# Patient Record
Sex: MALE | Race: ASIAN | HISPANIC OR LATINO | URBAN - METROPOLITAN AREA
[De-identification: names, ages, dates, MRNs, and addresses within clinical notes are randomized per-mention and may not be internally consistent; named-entity substitution may affect disease eponyms.]

---

## 2019-11-02 LAB
CREAT ?TM UR-SCNC: 69 UMOL/L
EXT MICROALBUMIN URINE RANDOM: 333.4
MICROALBUMIN/CREAT UR: 485.2 MG/G{CREAT}

## 2020-03-20 LAB — HBA1C MFR BLD HPLC: 8.8 %

## 2020-04-27 DIAGNOSIS — E11.649 UNCONTROLLED TYPE 2 DIABETES MELLITUS WITH HYPOGLYCEMIA, UNSPECIFIED HYPOGLYCEMIA COMA STATUS (HCC): Primary | ICD-10-CM

## 2020-04-27 RX ORDER — DULAGLUTIDE 0.75 MG/.5ML
0.5 INJECTION, SOLUTION SUBCUTANEOUS WEEKLY
COMMUNITY
Start: 2020-02-10 | End: 2020-04-27 | Stop reason: SDUPTHER

## 2020-04-27 RX ORDER — DULAGLUTIDE 0.75 MG/.5ML
0.5 INJECTION, SOLUTION SUBCUTANEOUS WEEKLY
Qty: 12 PEN | Refills: 1 | Status: SHIPPED | OUTPATIENT
Start: 2020-04-27 | End: 2020-06-29 | Stop reason: SDUPTHER

## 2020-06-08 ENCOUNTER — TELEPHONE (OUTPATIENT)
Dept: FAMILY MEDICINE CLINIC | Facility: CLINIC | Age: 45
End: 2020-06-08

## 2020-06-08 DIAGNOSIS — E11.649 UNCONTROLLED TYPE 2 DIABETES MELLITUS WITH HYPOGLYCEMIA, UNSPECIFIED HYPOGLYCEMIA COMA STATUS (HCC): Primary | ICD-10-CM

## 2020-06-10 RX ORDER — EMPAGLIFLOZIN, METFORMIN HYDROCHLORIDE 25; 1000 MG/1; MG/1
1 TABLET, EXTENDED RELEASE ORAL DAILY
COMMUNITY
Start: 2020-05-10 | End: 2020-06-10 | Stop reason: SDUPTHER

## 2020-06-10 RX ORDER — EMPAGLIFLOZIN, METFORMIN HYDROCHLORIDE 25; 1000 MG/1; MG/1
1 TABLET, EXTENDED RELEASE ORAL DAILY
Qty: 90 TABLET | Refills: 1 | Status: SHIPPED | OUTPATIENT
Start: 2020-06-10 | End: 2020-06-29 | Stop reason: SDUPTHER

## 2020-06-29 ENCOUNTER — OFFICE VISIT (OUTPATIENT)
Dept: FAMILY MEDICINE CLINIC | Facility: CLINIC | Age: 45
End: 2020-06-29
Payer: COMMERCIAL

## 2020-06-29 VITALS
HEIGHT: 67 IN | SYSTOLIC BLOOD PRESSURE: 108 MMHG | WEIGHT: 143.4 LBS | HEART RATE: 88 BPM | OXYGEN SATURATION: 98 % | RESPIRATION RATE: 16 BRPM | TEMPERATURE: 98.6 F | DIASTOLIC BLOOD PRESSURE: 74 MMHG | BODY MASS INDEX: 22.51 KG/M2

## 2020-06-29 DIAGNOSIS — H35.81 MACULAR RETINAL EDEMA: ICD-10-CM

## 2020-06-29 DIAGNOSIS — E78.2 MIXED HYPERLIPIDEMIA: ICD-10-CM

## 2020-06-29 DIAGNOSIS — E11.65 TYPE 2 DIABETES MELLITUS WITH HYPERGLYCEMIA, WITHOUT LONG-TERM CURRENT USE OF INSULIN (HCC): ICD-10-CM

## 2020-06-29 DIAGNOSIS — Z28.39 IMMUNIZATION DEFICIENCY: Primary | ICD-10-CM

## 2020-06-29 DIAGNOSIS — E13.319 RETINOPATHY DUE TO SECONDARY DIABETES (HCC): ICD-10-CM

## 2020-06-29 DIAGNOSIS — R80.9 MICROALBUMINURIA: ICD-10-CM

## 2020-06-29 DIAGNOSIS — E11.649 UNCONTROLLED TYPE 2 DIABETES MELLITUS WITH HYPOGLYCEMIA, UNSPECIFIED HYPOGLYCEMIA COMA STATUS (HCC): ICD-10-CM

## 2020-06-29 LAB — SL AMB POCT HGB: 8

## 2020-06-29 PROCEDURE — 4010F ACE/ARB THERAPY RXD/TAKEN: CPT | Performed by: FAMILY MEDICINE

## 2020-06-29 PROCEDURE — 99214 OFFICE O/P EST MOD 30 MIN: CPT | Performed by: FAMILY MEDICINE

## 2020-06-29 PROCEDURE — 1036F TOBACCO NON-USER: CPT | Performed by: FAMILY MEDICINE

## 2020-06-29 PROCEDURE — 3008F BODY MASS INDEX DOCD: CPT | Performed by: FAMILY MEDICINE

## 2020-06-29 PROCEDURE — 90471 IMMUNIZATION ADMIN: CPT | Performed by: FAMILY MEDICINE

## 2020-06-29 PROCEDURE — 85018 HEMOGLOBIN: CPT | Performed by: FAMILY MEDICINE

## 2020-06-29 PROCEDURE — 90746 HEPB VACCINE 3 DOSE ADULT IM: CPT | Performed by: FAMILY MEDICINE

## 2020-06-29 PROCEDURE — 3066F NEPHROPATHY DOC TX: CPT | Performed by: FAMILY MEDICINE

## 2020-06-29 RX ORDER — ATORVASTATIN CALCIUM 20 MG/1
TABLET, FILM COATED ORAL
COMMUNITY
Start: 2020-04-17 | End: 2020-06-29 | Stop reason: SDUPTHER

## 2020-06-29 RX ORDER — ATORVASTATIN CALCIUM 20 MG/1
20 TABLET, FILM COATED ORAL DAILY
Qty: 90 TABLET | Refills: 2 | Status: SHIPPED | OUTPATIENT
Start: 2020-06-29 | End: 2020-12-02 | Stop reason: SDUPTHER

## 2020-06-29 RX ORDER — EMPAGLIFLOZIN, METFORMIN HYDROCHLORIDE 25; 1000 MG/1; MG/1
1 TABLET, EXTENDED RELEASE ORAL DAILY
Qty: 90 TABLET | Refills: 1 | Status: SHIPPED | OUTPATIENT
Start: 2020-06-29 | End: 2021-01-13 | Stop reason: SDUPTHER

## 2020-06-29 RX ORDER — LISINOPRIL 2.5 MG/1
2.5 TABLET ORAL DAILY
Qty: 90 TABLET | Refills: 3 | Status: SHIPPED | OUTPATIENT
Start: 2020-06-29 | End: 2021-03-10 | Stop reason: SDUPTHER

## 2020-06-29 RX ORDER — DULAGLUTIDE 0.75 MG/.5ML
0.5 INJECTION, SOLUTION SUBCUTANEOUS WEEKLY
Qty: 12 PEN | Refills: 2 | Status: SHIPPED | OUTPATIENT
Start: 2020-06-29 | End: 2021-03-10

## 2020-07-30 ENCOUNTER — CLINICAL SUPPORT (OUTPATIENT)
Dept: FAMILY MEDICINE CLINIC | Facility: CLINIC | Age: 45
End: 2020-07-30
Payer: COMMERCIAL

## 2020-07-30 DIAGNOSIS — Z28.39 IMMUNIZATION DEFICIENCY: Primary | ICD-10-CM

## 2020-07-30 PROCEDURE — 90746 HEPB VACCINE 3 DOSE ADULT IM: CPT

## 2020-07-30 PROCEDURE — 90471 IMMUNIZATION ADMIN: CPT

## 2020-10-16 ENCOUNTER — TELEPHONE (OUTPATIENT)
Dept: FAMILY MEDICINE CLINIC | Facility: CLINIC | Age: 45
End: 2020-10-16

## 2020-10-30 DIAGNOSIS — E11.65 TYPE 2 DIABETES MELLITUS WITH HYPERGLYCEMIA, WITHOUT LONG-TERM CURRENT USE OF INSULIN (HCC): Primary | ICD-10-CM

## 2020-10-30 RX ORDER — SEMAGLUTIDE 1.34 MG/ML
0.5 INJECTION, SOLUTION SUBCUTANEOUS WEEKLY
Qty: 4 PEN | Refills: 2 | Status: SHIPPED | OUTPATIENT
Start: 2020-10-30 | End: 2021-03-10 | Stop reason: ALTCHOICE

## 2020-12-02 DIAGNOSIS — E78.2 MIXED HYPERLIPIDEMIA: ICD-10-CM

## 2020-12-02 RX ORDER — ATORVASTATIN CALCIUM 20 MG/1
20 TABLET, FILM COATED ORAL DAILY
Qty: 90 TABLET | Refills: 2 | Status: SHIPPED | OUTPATIENT
Start: 2020-12-02 | End: 2020-12-09 | Stop reason: SDUPTHER

## 2020-12-03 ENCOUNTER — TELEPHONE (OUTPATIENT)
Dept: FAMILY MEDICINE CLINIC | Facility: CLINIC | Age: 45
End: 2020-12-03

## 2020-12-03 DIAGNOSIS — E11.65 TYPE 2 DIABETES MELLITUS WITH HYPERGLYCEMIA, WITHOUT LONG-TERM CURRENT USE OF INSULIN (HCC): Primary | ICD-10-CM

## 2020-12-09 DIAGNOSIS — E78.2 MIXED HYPERLIPIDEMIA: ICD-10-CM

## 2020-12-09 LAB
LEFT EYE DIABETIC RETINOPATHY: NORMAL
RIGHT EYE DIABETIC RETINOPATHY: NORMAL

## 2020-12-09 RX ORDER — ATORVASTATIN CALCIUM 20 MG/1
20 TABLET, FILM COATED ORAL DAILY
Qty: 90 TABLET | Refills: 2 | Status: SHIPPED | OUTPATIENT
Start: 2020-12-09 | End: 2021-03-10 | Stop reason: SDUPTHER

## 2020-12-09 RX ORDER — ATORVASTATIN CALCIUM 20 MG/1
20 TABLET, FILM COATED ORAL DAILY
Qty: 90 TABLET | Refills: 2 | Status: CANCELLED | OUTPATIENT
Start: 2020-12-09 | End: 2021-03-09

## 2020-12-09 RX ORDER — ATORVASTATIN CALCIUM 20 MG/1
20 TABLET, FILM COATED ORAL DAILY
Qty: 90 TABLET | Refills: 2 | Status: SHIPPED | OUTPATIENT
Start: 2020-12-09 | End: 2020-12-09 | Stop reason: SDUPTHER

## 2020-12-09 NOTE — TELEPHONE ENCOUNTER
Pt's wife called and stated that he is in need of his atorvastatin to be sent to [facility]

## 2020-12-24 DIAGNOSIS — E11.65 TYPE 2 DIABETES MELLITUS WITH HYPERGLYCEMIA, WITHOUT LONG-TERM CURRENT USE OF INSULIN (HCC): ICD-10-CM

## 2021-01-13 DIAGNOSIS — E11.649 UNCONTROLLED TYPE 2 DIABETES MELLITUS WITH HYPOGLYCEMIA, UNSPECIFIED HYPOGLYCEMIA COMA STATUS (HCC): ICD-10-CM

## 2021-01-13 RX ORDER — EMPAGLIFLOZIN, METFORMIN HYDROCHLORIDE 25; 1000 MG/1; MG/1
1 TABLET, EXTENDED RELEASE ORAL DAILY
Qty: 90 TABLET | Refills: 1 | Status: SHIPPED | OUTPATIENT
Start: 2021-01-13 | End: 2021-01-18 | Stop reason: SDUPTHER

## 2021-01-18 ENCOUNTER — TELEPHONE (OUTPATIENT)
Dept: FAMILY MEDICINE CLINIC | Facility: CLINIC | Age: 46
End: 2021-01-18

## 2021-01-18 DIAGNOSIS — E11.649 UNCONTROLLED TYPE 2 DIABETES MELLITUS WITH HYPOGLYCEMIA, UNSPECIFIED HYPOGLYCEMIA COMA STATUS (HCC): ICD-10-CM

## 2021-01-18 RX ORDER — EMPAGLIFLOZIN, METFORMIN HYDROCHLORIDE 25; 1000 MG/1; MG/1
1 TABLET, EXTENDED RELEASE ORAL DAILY
Qty: 90 TABLET | Refills: 1 | Status: SHIPPED | OUTPATIENT
Start: 2021-01-18 | End: 2021-03-10 | Stop reason: SDUPTHER

## 2021-01-18 NOTE — TELEPHONE ENCOUNTER
Pt's wife called - per MyChart medication Synjardy XR 25-100Mg (1) daily is ok to reorder but [address] in [facility] [place] does not have - the Rx was called into [facility] for Mrs       Called back - [facility] is not showing the medication either - could the Rx please be called into [facility] for Pt

## 2021-03-08 LAB
LEFT EYE DIABETIC RETINOPATHY: NORMAL
RIGHT EYE DIABETIC RETINOPATHY: NORMAL

## 2021-03-10 ENCOUNTER — OFFICE VISIT (OUTPATIENT)
Dept: FAMILY MEDICINE CLINIC | Facility: CLINIC | Age: 46
End: 2021-03-10
Payer: COMMERCIAL

## 2021-03-10 VITALS
DIASTOLIC BLOOD PRESSURE: 90 MMHG | HEART RATE: 83 BPM | BODY MASS INDEX: 22.44 KG/M2 | SYSTOLIC BLOOD PRESSURE: 128 MMHG | TEMPERATURE: 97.2 F | OXYGEN SATURATION: 98 % | HEIGHT: 67 IN | RESPIRATION RATE: 16 BRPM | WEIGHT: 143 LBS

## 2021-03-10 DIAGNOSIS — E53.8 CYANOCOBALAMIN DEFICIENCY: ICD-10-CM

## 2021-03-10 DIAGNOSIS — E11.65 TYPE 2 DIABETES MELLITUS WITH HYPERGLYCEMIA, WITHOUT LONG-TERM CURRENT USE OF INSULIN (HCC): Primary | ICD-10-CM

## 2021-03-10 DIAGNOSIS — E55.9 VITAMIN D DEFICIENCY: ICD-10-CM

## 2021-03-10 DIAGNOSIS — R80.9 MICROALBUMINURIA: ICD-10-CM

## 2021-03-10 DIAGNOSIS — Z23 ENCOUNTER FOR IMMUNIZATION: ICD-10-CM

## 2021-03-10 DIAGNOSIS — Z28.39 IMMUNIZATION DEFICIENCY: ICD-10-CM

## 2021-03-10 DIAGNOSIS — Z11.9 ENCOUNTER FOR SCREENING FOR INFECTIOUS AND PARASITIC DISEASES, UNSPECIFIED: ICD-10-CM

## 2021-03-10 DIAGNOSIS — Z11.4 SCREENING FOR HIV (HUMAN IMMUNODEFICIENCY VIRUS): ICD-10-CM

## 2021-03-10 DIAGNOSIS — I10 ESSENTIAL HYPERTENSION: ICD-10-CM

## 2021-03-10 DIAGNOSIS — E78.2 MIXED HYPERLIPIDEMIA: ICD-10-CM

## 2021-03-10 DIAGNOSIS — Z23 NEED FOR VACCINATION: ICD-10-CM

## 2021-03-10 DIAGNOSIS — E11.649 UNCONTROLLED TYPE 2 DIABETES MELLITUS WITH HYPOGLYCEMIA, UNSPECIFIED HYPOGLYCEMIA COMA STATUS (HCC): ICD-10-CM

## 2021-03-10 LAB — SL AMB POCT HEMOGLOBIN AIC: 7.6 (ref ?–6.5)

## 2021-03-10 PROCEDURE — 83036 HEMOGLOBIN GLYCOSYLATED A1C: CPT | Performed by: FAMILY MEDICINE

## 2021-03-10 PROCEDURE — 36416 COLLJ CAPILLARY BLOOD SPEC: CPT | Performed by: FAMILY MEDICINE

## 2021-03-10 PROCEDURE — 99214 OFFICE O/P EST MOD 30 MIN: CPT | Performed by: FAMILY MEDICINE

## 2021-03-10 PROCEDURE — 90471 IMMUNIZATION ADMIN: CPT | Performed by: FAMILY MEDICINE

## 2021-03-10 PROCEDURE — 90746 HEPB VACCINE 3 DOSE ADULT IM: CPT | Performed by: FAMILY MEDICINE

## 2021-03-10 RX ORDER — DULAGLUTIDE 1.5 MG/.5ML
1.5 INJECTION, SOLUTION SUBCUTANEOUS WEEKLY
Qty: 12 PEN | Refills: 2 | Status: SHIPPED | OUTPATIENT
Start: 2021-03-10 | End: 2021-09-15 | Stop reason: SDUPTHER

## 2021-03-10 RX ORDER — ATORVASTATIN CALCIUM 20 MG/1
20 TABLET, FILM COATED ORAL DAILY
Qty: 90 TABLET | Refills: 2 | Status: SHIPPED | OUTPATIENT
Start: 2021-03-10 | End: 2021-09-26 | Stop reason: SDUPTHER

## 2021-03-10 RX ORDER — LISINOPRIL 2.5 MG/1
2.5 TABLET ORAL DAILY
Qty: 90 TABLET | Refills: 3 | Status: SHIPPED | OUTPATIENT
Start: 2021-03-10 | End: 2021-10-05 | Stop reason: SDUPTHER

## 2021-03-10 RX ORDER — EMPAGLIFLOZIN, METFORMIN HYDROCHLORIDE 25; 1000 MG/1; MG/1
1 TABLET, EXTENDED RELEASE ORAL DAILY
Qty: 90 TABLET | Refills: 2 | Status: SHIPPED | OUTPATIENT
Start: 2021-03-10 | End: 2021-07-15 | Stop reason: SDUPTHER

## 2021-03-10 NOTE — PROGRESS NOTES
Assessment/Plan: Third hep B vaccine given to him today  Hemoglobin A1c done in office is 7 6 much better than before it was 9 the past   Will see him back in 3 months follow-up blood sugar blood test   Meds refill for him  I have spent 25 minutes with Patient and family today in which greater than 50% of this time was spent in counseling/coordination of care regarding Risks and benefits of tx options         Problem List Items Addressed This Visit        Endocrine    Type 2 diabetes mellitus ([facility] [address] ) - Primary    Relevant Medications    Dulaglutide (Trulicity) 1 5 EJ/0 4BB SOPN    Synjardy XR  MG TB24    Other Relevant Orders    POCT hemoglobin A1c (Completed)    Microalbumin,Urine    CBC and differential    Comprehensive metabolic panel    Lipid Panel with Direct LDL reflex    Hemoglobin A1C       Other    Hyperlipidemia    Relevant Medications    atorvastatin (LIPITOR) 20 mg tablet    Microalbuminuria    Relevant Medications    lisinopril (ZESTRIL) 2 5 mg tablet    Immunization deficiency    Relevant Orders    Hepatitis A antibody, total      Other Visit Diagnoses     Screening for HIV (human immunodeficiency virus)        Relevant Orders    HIV 1/2 Antigen/Antibody (4th Generation) w Reflex [facility]    Essential hypertension        Relevant Medications    lisinopril (ZESTRIL) 2 5 mg tablet    Other Relevant Orders    Microalbumin,Urine    Comprehensive metabolic panel    TSH, 3rd generation with Free T4 reflex    UA w Reflex to Microscopic w Reflex to Culture    Uric acid    Vitamin D deficiency        Relevant Orders    Vitamin D 25 hydroxy    Cyanocobalamin deficiency        Relevant Orders    Vitamin B12    Uncontrolled type 2 diabetes mellitus with hypoglycemia, unspecified hypoglycemia coma status (HCC)        Relevant Medications    Dulaglutide (Trulicity) 1 5 ER/4 6AD SOPN    Synjardy XR  MG TB24    Encounter for screening for infectious and parasitic diseases, unspecified        Relevant Orders    Novel Coronavirus (Covid-19),PCR [facility] - Collected at [facility] or [facility]            Subjective:      Patient ID: Rhonda Hamman is a 50 y o  male  24-year-old male follow-up type 2 diabetes microalbuminuria  He is on Trulicity 1 5 mg weekly Synjardy daily  He was placed on lisinopril 2 5 mg daily for microalbuminuria  He has diabetic retinopathy follow-up with Dr Viviana Mujica  He had injection both his eyes  He works as a  he paints cars  [place]  He needs COVID screening  He is not immune to hepatitis-B infection  He had 2/3  vaccine  The following portions of the patient's history were reviewed and updated as appropriate:   Past Medical History:  He has a past medical history of Diabetes mellitus ([facility] ), Hyperlipidemia, Immunization deficiency (6/29/2020), Macular retinal edema, Microalbuminuria (6/29/2020), Retinopathy due to secondary diabetes ([facility] ), and Type 2 diabetes mellitus ([facility] )  ,  _______________________________________________________________________  Medical Problems:  does not have any pertinent problems on file ,  _______________________________________________________________________  Past Surgical History:   has a past surgical history that includes Lipoma resection  ,  _______________________________________________________________________  Family History:  family history includes Cancer in his maternal uncle; Hypertension in his father; Stroke in his father ,  _______________________________________________________________________  Social History:   reports that he has never smoked  He has never used smokeless tobacco  He reports current alcohol use  No history on file for drug ,  _______________________________________________________________________  Allergies:  has No Known Allergies     _______________________________________________________________________  Current Outpatient Medications   Medication Sig Dispense Refill    atorvastatin (LIPITOR) 20 mg tablet Take 1 tablet (20 mg total) by mouth daily 90 tablet 2    Dulaglutide (Trulicity) 1 5 XY/8 5MM SOPN Inject 0 5 mL (1 5 mg total) under the skin once a week 12 pen 2    lisinopril (ZESTRIL) 2 5 mg tablet Take 1 tablet (2 5 mg total) by mouth daily 90 tablet 3    Omega-3 Fatty Acids (FISH OIL PO) Take by mouth      Synjardy XR  MG TB24 Take 1 tablet by mouth daily 90 tablet 2     No current facility-administered medications for this visit       _______________________________________________________________________  Review of Systems   Constitutional: Negative for activity change, appetite change, fatigue, fever and unexpected weight change  HENT: Negative for dental problem and trouble swallowing  Eyes: Negative for photophobia and visual disturbance  Respiratory: Negative for cough and chest tightness  Cardiovascular: Negative for chest pain, palpitations and leg swelling  Gastrointestinal: Negative for abdominal pain, constipation and vomiting  Endocrine: Negative for cold intolerance, polydipsia and polyuria  Genitourinary: Negative for difficulty urinating, frequency and urgency  Musculoskeletal: Negative for arthralgias, joint swelling, myalgias and neck pain  Skin: Negative for color change, rash and wound  Allergic/Immunologic: Negative for environmental allergies  Neurological: Negative for dizziness, weakness and numbness  Hematological: Does not bruise/bleed easily  Psychiatric/Behavioral: Negative for decreased concentration, dysphoric mood, self-injury, sleep disturbance and suicidal ideas  Objective:  Vitals:    03/10/21 1448   BP: 128/90   BP Location: Left arm   Patient Position: Sitting   Cuff Size: Standard   Pulse: 83   Resp: 16   Temp: (!) 97 2 °F (36 2 °C)   TempSrc: Temporal   SpO2: 98%   Weight: 64 9 kg (143 lb)   Height: 5' 7" (1 702 m)     Body mass index is 22 4 kg/m²       Physical Exam  Vitals signs and nursing note reviewed  Constitutional:       Appearance: Normal appearance  He is well-developed  HENT:      Head: Normocephalic and atraumatic  Right Ear: Tympanic membrane normal       Left Ear: Tympanic membrane normal    Eyes:      Pupils: Pupils are equal, round, and reactive to light  Neck:      Musculoskeletal: Normal range of motion and neck supple  Cardiovascular:      Rate and Rhythm: Normal rate and regular rhythm  Pulses: no weak pulses          Dorsalis pedis pulses are 2+ on the right side and 2+ on the left side  Posterior tibial pulses are 1+ on the right side and 1+ on the left side  Heart sounds: Normal heart sounds  Pulmonary:      Effort: Pulmonary effort is normal       Breath sounds: Normal breath sounds  Abdominal:      General: Bowel sounds are normal       Palpations: Abdomen is soft  Musculoskeletal: Normal range of motion  Feet:      Right foot:      Skin integrity: Callus present  No ulcer, skin breakdown, erythema, warmth or dry skin  Left foot:      Skin integrity: Callus present  No ulcer, skin breakdown, erythema, warmth or dry skin  Skin:     General: Skin is warm and dry  Capillary Refill: Capillary refill takes less than 2 seconds  Neurological:      General: No focal deficit present  Mental Status: He is alert and oriented to person, place, and time  Mental status is at baseline  Psychiatric:         Mood and Affect: Mood normal          Behavior: Behavior normal          Thought Content: Thought content normal          Judgment: Judgment normal        Patient's shoes and socks removed  Right Foot/Ankle   Right Foot Inspection  Skin Exam: skin normal, skin intact, callus and callus no dry skin, no warmth, no erythema, no maceration, no abnormal color, no pre-ulcer and no ulcer                          Toe Exam: ROM and strength within normal limits  Sensory   Vibration: intact  Proprioception: intact   Monofilament testing: intact  Vascular  Capillary refills: < 3 seconds  The right DP pulse is 2+  The right PT pulse is 1+  Right Toe  - Comprehensive Exam  Ecchymosis: none  Arch: normal  Hammertoes: absent  Claw Toes: absent  Swelling: none   Tenderness: none         Left Foot/Ankle  Left Foot Inspection  Skin Exam: skin normal, skin intact and callusno dry skin, no warmth, no erythema, no maceration, normal color, no pre-ulcer and no ulcer                         Toe Exam: ROM and strength within normal limits                   Sensory   Vibration: intact  Proprioception: intact  Monofilament: intact  Vascular  Capillary refills: < 3 seconds  The left DP pulse is 2+  The left PT pulse is 1+  Left Toe  - Comprehensive Exam  Ecchymosis: none  Arch: normal  Hammertoes: absent  Claw toes: absent  Swelling: none   Tenderness: none       Assign Risk Category:  No deformity present; No loss of protective sensation;  No weak pulses       Risk: 0

## 2021-03-16 ENCOUNTER — IMMUNIZATIONS (OUTPATIENT)
Dept: FAMILY MEDICINE CLINIC | Facility: HOSPITAL | Age: 46
End: 2021-03-16

## 2021-03-16 DIAGNOSIS — Z23 ENCOUNTER FOR IMMUNIZATION: Primary | ICD-10-CM

## 2021-03-16 PROCEDURE — 0011A SARS-COV-2 / COVID-19 MRNA VACCINE (MODERNA) 100 MCG: CPT

## 2021-03-16 PROCEDURE — 91301 SARS-COV-2 / COVID-19 MRNA VACCINE (MODERNA) 100 MCG: CPT

## 2021-04-15 ENCOUNTER — IMMUNIZATIONS (OUTPATIENT)
Dept: FAMILY MEDICINE CLINIC | Facility: HOSPITAL | Age: 46
End: 2021-04-15

## 2021-04-15 DIAGNOSIS — Z23 ENCOUNTER FOR IMMUNIZATION: Primary | ICD-10-CM

## 2021-04-15 PROCEDURE — 91301 SARS-COV-2 / COVID-19 MRNA VACCINE (MODERNA) 100 MCG: CPT

## 2021-04-15 PROCEDURE — 0012A SARS-COV-2 / COVID-19 MRNA VACCINE (MODERNA) 100 MCG: CPT

## 2021-05-29 ENCOUNTER — APPOINTMENT (OUTPATIENT)
Dept: LAB | Age: 46
End: 2021-05-29
Payer: COMMERCIAL

## 2021-05-29 DIAGNOSIS — E53.8 CYANOCOBALAMIN DEFICIENCY: ICD-10-CM

## 2021-05-29 DIAGNOSIS — Z11.4 SCREENING FOR HIV (HUMAN IMMUNODEFICIENCY VIRUS): ICD-10-CM

## 2021-05-29 DIAGNOSIS — E11.65 TYPE 2 DIABETES MELLITUS WITH HYPERGLYCEMIA, WITHOUT LONG-TERM CURRENT USE OF INSULIN (HCC): ICD-10-CM

## 2021-05-29 DIAGNOSIS — Z28.39 IMMUNIZATION DEFICIENCY: ICD-10-CM

## 2021-05-29 DIAGNOSIS — I10 ESSENTIAL HYPERTENSION: ICD-10-CM

## 2021-05-29 DIAGNOSIS — E55.9 VITAMIN D DEFICIENCY: ICD-10-CM

## 2021-05-29 LAB
25(OH)D3 SERPL-MCNC: 32.8 NG/ML (ref 30–100)
ALBUMIN SERPL BCP-MCNC: 3.9 G/DL (ref 3.5–5)
ALP SERPL-CCNC: 75 U/L (ref 46–116)
ALT SERPL W P-5'-P-CCNC: 40 U/L (ref 12–78)
ANION GAP SERPL CALCULATED.3IONS-SCNC: 1 MMOL/L (ref 4–13)
ANISOCYTOSIS BLD QL SMEAR: PRESENT
AST SERPL W P-5'-P-CCNC: 21 U/L (ref 5–45)
BACTERIA UR QL AUTO: NORMAL /HPF
BASOPHILS # BLD MANUAL: 0 THOUSAND/UL (ref 0–0.1)
BASOPHILS NFR MAR MANUAL: 0 % (ref 0–1)
BILIRUB SERPL-MCNC: 0.64 MG/DL (ref 0.2–1)
BILIRUB UR QL STRIP: NEGATIVE
BUN SERPL-MCNC: 22 MG/DL (ref 5–25)
CALCIUM SERPL-MCNC: 9.4 MG/DL (ref 8.3–10.1)
CHLORIDE SERPL-SCNC: 105 MMOL/L (ref 100–108)
CHOLEST SERPL-MCNC: 140 MG/DL (ref 50–200)
CLARITY UR: CLEAR
CO2 SERPL-SCNC: 31 MMOL/L (ref 21–32)
COLOR UR: YELLOW
CREAT SERPL-MCNC: 0.77 MG/DL (ref 0.6–1.3)
CREAT UR-MCNC: 65.9 MG/DL
EOSINOPHIL # BLD MANUAL: 3.03 THOUSAND/UL (ref 0–0.4)
EOSINOPHIL NFR BLD MANUAL: 31 % (ref 0–6)
ERYTHROCYTE [DISTWIDTH] IN BLOOD BY AUTOMATED COUNT: 12 % (ref 11.6–15.1)
EST. AVERAGE GLUCOSE BLD GHB EST-MCNC: 183 MG/DL
GFR SERPL CREATININE-BSD FRML MDRD: 108 ML/MIN/1.73SQ M
GLUCOSE P FAST SERPL-MCNC: 143 MG/DL (ref 65–99)
GLUCOSE UR STRIP-MCNC: ABNORMAL MG/DL
HBA1C MFR BLD: 8 %
HCT VFR BLD AUTO: 42.7 % (ref 36.5–49.3)
HDLC SERPL-MCNC: 45 MG/DL
HGB BLD-MCNC: 13.4 G/DL (ref 12–17)
HGB UR QL STRIP.AUTO: NEGATIVE
HIV 1+2 AB+HIV1 P24 AG SERPL QL IA: NORMAL
HYALINE CASTS #/AREA URNS LPF: NORMAL /LPF
KETONES UR STRIP-MCNC: NEGATIVE MG/DL
LDLC SERPL CALC-MCNC: 83 MG/DL (ref 0–100)
LEUKOCYTE ESTERASE UR QL STRIP: ABNORMAL
LYMPHOCYTES # BLD AUTO: 3.22 THOUSAND/UL (ref 0.6–4.47)
LYMPHOCYTES # BLD AUTO: 33 % (ref 14–44)
MCH RBC QN AUTO: 30.5 PG (ref 26.8–34.3)
MCHC RBC AUTO-ENTMCNC: 31.4 G/DL (ref 31.4–37.4)
MCV RBC AUTO: 97 FL (ref 82–98)
MICROALBUMIN UR-MCNC: 50.2 MG/L (ref 0–20)
MICROALBUMIN/CREAT 24H UR: 76 MG/G CREATININE (ref 0–30)
MONOCYTES # BLD AUTO: 0.49 THOUSAND/UL (ref 0–1.22)
MONOCYTES NFR BLD: 5 % (ref 4–12)
NEUTROPHILS # BLD MANUAL: 3.03 THOUSAND/UL (ref 1.85–7.62)
NEUTS SEG NFR BLD AUTO: 31 % (ref 43–75)
NITRITE UR QL STRIP: NEGATIVE
NON-SQ EPI CELLS URNS QL MICRO: NORMAL /HPF
NRBC BLD AUTO-RTO: 0 /100 WBCS
PH UR STRIP.AUTO: 6 [PH]
PLATELET # BLD AUTO: 214 THOUSANDS/UL (ref 149–390)
PLATELET BLD QL SMEAR: ADEQUATE
PMV BLD AUTO: 10.7 FL (ref 8.9–12.7)
POLYCHROMASIA BLD QL SMEAR: PRESENT
POTASSIUM SERPL-SCNC: 5 MMOL/L (ref 3.5–5.3)
PROT SERPL-MCNC: 7.5 G/DL (ref 6.4–8.2)
PROT UR STRIP-MCNC: NEGATIVE MG/DL
RBC # BLD AUTO: 4.39 MILLION/UL (ref 3.88–5.62)
RBC #/AREA URNS AUTO: NORMAL /HPF
SODIUM SERPL-SCNC: 137 MMOL/L (ref 136–145)
SP GR UR STRIP.AUTO: 1.04 (ref 1–1.03)
TOTAL CELLS COUNTED SPEC: 100
TRIGL SERPL-MCNC: 58 MG/DL
TSH SERPL DL<=0.05 MIU/L-ACNC: 1.32 UIU/ML (ref 0.36–3.74)
URATE SERPL-MCNC: 4.3 MG/DL (ref 4.2–8)
UROBILINOGEN UR QL STRIP.AUTO: 1 E.U./DL
VIT B12 SERPL-MCNC: 798 PG/ML (ref 100–900)
WBC # BLD AUTO: 9.77 THOUSAND/UL (ref 4.31–10.16)
WBC #/AREA URNS AUTO: NORMAL /HPF

## 2021-05-29 PROCEDURE — 82306 VITAMIN D 25 HYDROXY: CPT

## 2021-05-29 PROCEDURE — 80053 COMPREHEN METABOLIC PANEL: CPT

## 2021-05-29 PROCEDURE — 82043 UR ALBUMIN QUANTITATIVE: CPT

## 2021-05-29 PROCEDURE — 81001 URINALYSIS AUTO W/SCOPE: CPT | Performed by: FAMILY MEDICINE

## 2021-05-29 PROCEDURE — 87389 HIV-1 AG W/HIV-1&-2 AB AG IA: CPT

## 2021-05-29 PROCEDURE — 82570 ASSAY OF URINE CREATININE: CPT

## 2021-05-29 PROCEDURE — 85027 COMPLETE CBC AUTOMATED: CPT

## 2021-05-29 PROCEDURE — 82607 VITAMIN B-12: CPT

## 2021-05-29 PROCEDURE — 83036 HEMOGLOBIN GLYCOSYLATED A1C: CPT

## 2021-05-29 PROCEDURE — 84550 ASSAY OF BLOOD/URIC ACID: CPT

## 2021-05-29 PROCEDURE — 85007 BL SMEAR W/DIFF WBC COUNT: CPT

## 2021-05-29 PROCEDURE — 36415 COLL VENOUS BLD VENIPUNCTURE: CPT

## 2021-05-29 PROCEDURE — 80061 LIPID PANEL: CPT

## 2021-05-29 PROCEDURE — 84443 ASSAY THYROID STIM HORMONE: CPT

## 2021-05-29 PROCEDURE — 86708 HEPATITIS A ANTIBODY: CPT

## 2021-05-30 LAB — HAV AB SER QL IA: REACTIVE

## 2021-06-03 LAB
LEFT EYE DIABETIC RETINOPATHY: NORMAL
RIGHT EYE DIABETIC RETINOPATHY: NORMAL

## 2021-06-04 LAB
LEFT EYE DIABETIC RETINOPATHY: NORMAL
RIGHT EYE DIABETIC RETINOPATHY: NORMAL

## 2021-06-11 RX ORDER — PREDNISOLONE ACETATE 10 MG/ML
1 SUSPENSION/ DROPS OPHTHALMIC
COMMUNITY
Start: 2021-05-25 | End: 2021-07-15 | Stop reason: ALTCHOICE

## 2021-06-14 ENCOUNTER — CONSULT (OUTPATIENT)
Dept: FAMILY MEDICINE CLINIC | Facility: CLINIC | Age: 46
End: 2021-06-14
Payer: COMMERCIAL

## 2021-06-14 VITALS
DIASTOLIC BLOOD PRESSURE: 70 MMHG | TEMPERATURE: 97.9 F | HEART RATE: 81 BPM | OXYGEN SATURATION: 98 % | HEIGHT: 67 IN | BODY MASS INDEX: 21.94 KG/M2 | WEIGHT: 139.8 LBS | RESPIRATION RATE: 16 BRPM | SYSTOLIC BLOOD PRESSURE: 118 MMHG

## 2021-06-14 DIAGNOSIS — Z00.00 ANNUAL PHYSICAL EXAM: ICD-10-CM

## 2021-06-14 DIAGNOSIS — Z01.818 PREOPERATIVE CLEARANCE: Primary | ICD-10-CM

## 2021-06-14 DIAGNOSIS — E13.319 RETINOPATHY DUE TO SECONDARY DIABETES (HCC): ICD-10-CM

## 2021-06-14 DIAGNOSIS — R80.9 MICROALBUMINURIA: ICD-10-CM

## 2021-06-14 DIAGNOSIS — H35.81 MACULAR RETINAL EDEMA: ICD-10-CM

## 2021-06-14 DIAGNOSIS — E11.65 TYPE 2 DIABETES MELLITUS WITH HYPERGLYCEMIA, WITHOUT LONG-TERM CURRENT USE OF INSULIN (HCC): ICD-10-CM

## 2021-06-14 DIAGNOSIS — E78.2 MIXED HYPERLIPIDEMIA: ICD-10-CM

## 2021-06-14 LAB — ECG INTERP BEFORE EX: NORMAL MS

## 2021-06-14 PROCEDURE — 99396 PREV VISIT EST AGE 40-64: CPT | Performed by: NURSE PRACTITIONER

## 2021-06-14 PROCEDURE — 93000 ELECTROCARDIOGRAM COMPLETE: CPT | Performed by: NURSE PRACTITIONER

## 2021-06-14 RX ORDER — ATROPINE SULFATE 10 MG/ML
SOLUTION/ DROPS OPHTHALMIC
COMMUNITY
Start: 2021-06-03 | End: 2021-07-15 | Stop reason: ALTCHOICE

## 2021-06-14 RX ORDER — ACETAZOLAMIDE 500 MG/1
CAPSULE, EXTENDED RELEASE ORAL
COMMUNITY
Start: 2021-06-02 | End: 2021-07-15 | Stop reason: ALTCHOICE

## 2021-06-14 NOTE — PATIENT INSTRUCTIONS
Medicine Management Before Surgery   AMBULATORY CARE:   Medicine management before surgery  means creating a plan with your healthcare providers to stop, start, or change your medicines  The plan can help prevent complications during and after surgery  The plan may also prevent your surgery from getting canceled or delayed  How to create a medicine management plan:   · Tell all of your healthcare providers about your surgery  Schedule appointments to see them before your surgery  You may need blood work or tests before surgery to help your healthcare provider make changes to your medicines  Some of your medicines may need to be stopped several days to weeks before surgery  Other medicines may be stopped the night before, or need to be taken the day of surgery  Do not stop taking your medicine until you talk to your healthcare providers  · Bring a list of all of your medicines, vitamins, and dietary supplements to your preoperative appointment and on the day of surgery  You may also bring your pill bottles  This will help your anesthesiologist plan your anesthesia and keep you safe during and after surgery  · If your healthcare provider tells you to take medicine on the day of surgery, take it in the morning with a sip of water  On the day of your surgery, tell healthcare providers what medicines you did or did not take that day  Contact your healthcare provider if:   · You have questions about when to stop taking your medicine before surgery  · You have questions about what medicines to take or not take on the day of your surgery  · You need a prescription refilled before surgery  Blood thinner and antiplatelet medicine: If you take blood thinners or antiplatelet medicines, you may be at risk for heavy bleeding during or after surgery  Ask your healthcare provider if you need to stop taking your medicine, and when to stop   After you have stopped your medicine for several days, you may need blood tests  Your surgery may be canceled or rescheduled if your blood tests are abnormal   · You may need to stop taking your blood thinner, such as warfarin, 5 to 7 days before your surgery  While you are not taking your blood thinner, you may need daily injections of heparin  Heparin may decrease your risk for a blood clot while you are not taking your blood thinner  You may need heparin injections once per day, starting the day that you stop your blood thinner  You may continue heparin injections until the day of your surgery  Talk to your healthcare provider about heparin injections  You may need a family member or friend to give you the heparin injection  · New oral anticoagulants (NOAC), such as rivaroxaban, may be stopped closer to the day of your surgery  Your healthcare provider may tell you to stop your NOAC 1 to 3 days before surgery  You will not need heparin injections or other medicines while you are not taking your NOAC  Talk to your healthcare provider before you stop taking your NOAC  · Your healthcare provider may tell you to stop taking antiplatelet medicine, such as aspirin, 5 to 7 days before surgery  You may instead need to continue taking your medicine until the day of your surgery  Talk to your healthcare provider about when to stop these medicines  Heart medicine: Take your heart medicine on the day of your surgery unless your healthcare provider tells you not to  Heart medicines may decrease your risk for complications during or after surgery  If you have had a heart attack or chest pain during exercise, you may need to see a cardiologist before surgery  You may also need to see a cardiologist if you have coronary artery disease  The cardiologist may change your medicines or start you on a heart medicine called a beta-blocker  A beta-blocker controls your heart rate, and may decrease your risk for heart problems during or after surgery     Blood pressure medicine:   · Take your blood pressure medicine on the day of surgery unless your healthcare provider tells you not to  Ask if you should take your diuretic, angiotensin-converting enzyme (ACE) inhibitor, or angiotensin receptor blocker (ARB) on the day of surgery  These medicines may need to be stopped 1 to 2 days before surgery  Diuretic medicines may cause your blood pressure to go too low during surgery  If you take more than one blood pressure medicine, ask which medicine to take on the day of your surgery  · You may need your blood pressure checked a few days before your surgery  This may help your healthcare provider make changes to your medicine and get you ready for surgery  If your blood pressure is not controlled before your surgery, the surgery may be canceled or delayed  Diabetes medicine:   · Keep a diary of your blood sugar levels for 2 weeks before your surgery  Bring your diary to your preoperative appointments  This will help your healthcare providers plan how to change, or when to stop, your diabetes medicine before surgery  · Talk to your healthcare provider about managing your diabetes medicine before surgery  You may be told to lower your dose of long-acting or intermediate-acting insulin the night before, or the morning of, your surgery  You may also be told to skip your dose of fast-acting insulin on the morning of your surgery  Do not take your oral diabetes medicine on the day of your surgery unless your healthcare provider says it is okay  Oral diabetes medicine may cause your blood sugar to drop too low during or after surgery  · If you use an insulin pump, ask what you should do the night before, and morning of, your surgery  Your healthcare provider may tell you to continue or lower your basal dose, and skip your bolus doses, on the day of surgery  Bring extra supplies for you insulin pump, such as cartridges, tubing, and needles   When you arrive for surgery, tell all healthcare providers that you use an insulin pump  They will need to know how your pump works, what your basal rate is, and how to make changes to your basal rate  Your anesthesiologist may disconnect your insulin pump and use IV insulin instead to control your blood sugars during surgery  · Check your blood sugar level on the morning before your surgery  Fasting may cause your blood sugar to be low  The stress of surgery may instead cause your blood sugar to be high  At your preoperative appointment ask your healthcare provider what to do on the morning of surgery if your blood sugar is high or low  Your blood sugar will be monitored closely before, during, and after surgery  This will prevent complications such as poor wound healing and infection  Antiseizure medicine: Take your antiseizure medicine on the morning of your surgery unless your healthcare provider tells you not to  Your healthcare provider may give you antiseizure medicine in your IV before or after surgery  Nonopioid pain medicine:  Stop taking your nonopioid pain medicine 2 days before surgery  Nonopioid pain medicine, such as NSAIDs, may increase your risk for bleeding during surgery  Other nonopioid medicine, such as gabapentin, may interfere with other medicines you may get during surgery  Steroids: Take your steroid medicine on the day of your surgery unless your healthcare provider tells you not to  Your healthcare provider may give you an extra, larger dose of IV steroids before or during your surgery to prevent low blood pressure  Hormones:  Take your thyroid medicine on the morning of your surgery unless your healthcare provider tells you not to  Ask your healthcare provider if you should take other hormone medicines on the day of surgery  Immunosuppressants: You may need to stop taking your immunosuppressants several days before, or the night before, your surgery   Immunosuppressants may increase your risk for wound infection and prevent wound healing  Talk to your healthcare provider about when to stop your immunosuppressants  Diet medicines:  Ask your healthcare provider when to stop taking diet medicine  Some diet medicines need to be stopped several days to weeks before surgery  Diet medicine may prevent other medicines from working or cause complications during surgery  Vitamins and herbal supplements:  Stop taking herbal supplements 1 week before surgery  Most vitamins can be taken up to the day before surgery  Ask your healthcare provider if you need to stop taking any vitamins before surgery  Some vitamins and herbal supplements may increase your risk for bleeding during surgery  They can also prevent anesthesia medicines from working right or increase your risk for a heart attack or stroke during surgery  Asthma or medicine for COPD:  Take your inhalers and asthma or COPD medicine on the morning of surgery  Bring your inhalers with you to your surgery  Anxiety, depression, or psychiatric medicine: Take your anxiety, depression, or psychiatric medicine on the morning of surgery, unless your healthcare provider tells you not to  Tell your anesthesiologist if you take a monoamine oxidase inhibitor (MAOI), such as phenelzine sulfate  To prevent complications, your anesthesiologist may need to change the type of anesthesia that you will get during surgery  Other medicines:  Ask your healthcare provider if and when you should stop take any other type of medicine before surgery  Follow up with your healthcare provider as directed:  Write down your questions so you remember to ask them during your visits  © Copyright [address] Information is for End User's use only and may not be sold, redistributed or otherwise used for commercial purposes  All illustrations and images included in CareNotes® are the copyrighted property of [employer]  or [facility] [employer]   The above information is an  only   It is not intended as medical advice for individual conditions or treatments  Talk to your doctor, nurse or pharmacist before following any medical regimen to see if it is safe and effective for you

## 2021-06-14 NOTE — PROGRESS NOTES
NORMAL BMI  21 90       Assessment/Plan:    Presents in office for for preoperative clearance for vitrectomy to right eye - surgery scheduled for tomorrow 6/15/2021 with [facility] [place] retina   Labs reviewed   Hba1C 8 0 up from 7 6 - feels that eye medications may be elevating his sugars     On lisinopril for renal protection / HTN control   Hyperlipemia - stable  Denies any urinary issues   EKG done in office NSR   PLAN: [facility] with caution --> discussed glucose control and management during recovery time   Patient is not on any blood thinners        Problem List Items Addressed This Visit        Endocrine    Type 2 diabetes mellitus ([address] )    Relevant Orders    POCT ECG (Completed)    Retinopathy due to secondary diabetes ([address] )    Relevant Medications    atropine (ISOPTO ATROPINE) 1 % ophthalmic solution    Other Relevant Orders    POCT ECG (Completed)       Other    Macular retinal edema    Relevant Medications    atropine (ISOPTO ATROPINE) 1 % ophthalmic solution    Other Relevant Orders    POCT ECG (Completed)    Hyperlipidemia    Relevant Orders    POCT ECG (Completed)    Microalbuminuria    Relevant Orders    POCT ECG (Completed)      Other Visit Diagnoses     Preoperative clearance    -  Primary    Relevant Orders    POCT ECG (Completed)    Annual physical exam                Subjective:      Patient ID: Trina Quintero is a 50 y o  male      Presents in office for for preoperative clearance for vitrectomy to right eye - surgery scheduled for tomorrow 6/15/2021 with [facility] retina   Patient of Dr Bc Xiao   Patient Active Problem List:     Type 2 diabetes mellitus ([address] )     Retinopathy due to secondary diabetes ([address] )     Macular retinal edema     Hyperlipidemia     Microalbuminuria     Immunization deficiency        The following portions of the patient's history were reviewed and updated as appropriate:   Past Medical History:  He has a past medical history of Diabetes mellitus ([address] ), Hyperlipidemia, Immunization deficiency (6/29/2020), Macular retinal edema, Microalbuminuria (6/29/2020), Retinopathy due to secondary diabetes ([facility] ), and Type 2 diabetes mellitus ([facility] )  ,  _______________________________________________________________________  Medical Problems:  does not have any pertinent problems on file ,  _______________________________________________________________________  Past Surgical History:   has a past surgical history that includes Lipoma resection  ,  _______________________________________________________________________  Family History:  family history includes Cancer in his maternal uncle; Hypertension in his father; Stroke in his father ,  _______________________________________________________________________  Social History:   reports that he has never smoked  He has never used smokeless tobacco  He reports current alcohol use  He reports that he does not use drugs  ,  _______________________________________________________________________  Allergies:  has No Known Allergies     _______________________________________________________________________  Current Outpatient Medications   Medication Sig Dispense Refill    acetaZOLAMIDE (DIAMOX) 500 mg capsule       atorvastatin (LIPITOR) 20 mg tablet Take 1 tablet (20 mg total) by mouth daily 90 tablet 2    atropine (ISOPTO ATROPINE) 1 % ophthalmic solution       Dulaglutide (Trulicity) 1 5 QK/8 8ZE SOPN Inject 0 5 mL (1 5 mg total) under the skin once a week 12 pen 2    lisinopril (ZESTRIL) 2 5 mg tablet Take 1 tablet (2 5 mg total) by mouth daily 90 tablet 3    Omega-3 Fatty Acids (FISH OIL PO) Take by mouth      prednisoLONE acetate (PRED FORTE) 1 % ophthalmic suspension Apply 1 drop to eye      Synjardy XR  MG TB24 Take 1 tablet by mouth daily 90 tablet 2     No current facility-administered medications for this visit      _______________________________________________________________________  Review of Systems Constitutional: Negative for activity change, appetite change, fatigue, fever and unexpected weight change  HENT: Negative for dental problem, mouth sores, rhinorrhea and trouble swallowing  Eyes: Negative for photophobia and visual disturbance  Respiratory: Negative for cough and chest tightness  Cardiovascular: Negative for chest pain, palpitations and leg swelling  Hyperlipidemia    Gastrointestinal: Negative for abdominal pain, constipation and vomiting  Endocrine: Negative for cold intolerance, polydipsia and polyuria  Type 2 diabetes    Genitourinary: Negative for difficulty urinating, frequency and urgency  Musculoskeletal: Negative for arthralgias, joint swelling, myalgias and neck pain  Skin: Negative for color change, rash and wound  Allergic/Immunologic: Negative for environmental allergies  Neurological: Negative for dizziness, weakness and numbness  Hematological: Does not bruise/bleed easily  Psychiatric/Behavioral: Negative for decreased concentration, dysphoric mood, self-injury, sleep disturbance and suicidal ideas  The patient is not nervous/anxious  Objective:  Vitals:    06/14/21 0805   BP: 118/70   BP Location: Left arm   Patient Position: Sitting   Cuff Size: Standard   Pulse: 81   Resp: 16   Temp: 97 9 °F (36 6 °C)   TempSrc: Temporal   SpO2: 98%   Weight: 63 4 kg (139 lb 12 8 oz)   Height: 5' 7" (1 702 m)     Body mass index is 21 9 kg/m²  Physical Exam  Vitals and nursing note reviewed  Constitutional:       Appearance: Normal appearance  He is well-developed  HENT:      Head: Normocephalic and atraumatic  Right Ear: Tympanic membrane normal       Left Ear: Tympanic membrane normal       Mouth/Throat:      Mouth: Mucous membranes are moist       Pharynx: No oropharyngeal exudate or posterior oropharyngeal erythema  Eyes:      Pupils: Pupils are equal, round, and reactive to light     Cardiovascular:      Rate and Rhythm: Normal rate and regular rhythm  Pulses:           Dorsalis pedis pulses are 2+ on the right side and 2+ on the left side  Posterior tibial pulses are 1+ on the right side and 1+ on the left side  Heart sounds: Normal heart sounds  Comments: EKG DONE IN OFFICE NSR   Pulmonary:      Effort: Pulmonary effort is normal       Breath sounds: Normal breath sounds  Abdominal:      General: Abdomen is flat  Bowel sounds are normal       Palpations: Abdomen is soft  Musculoskeletal:         General: Normal range of motion  Cervical back: Normal range of motion and neck supple  Feet:      Right foot:      Skin integrity: Callus present  No ulcer, skin breakdown, erythema, warmth or dry skin  Left foot:      Skin integrity: Callus present  No ulcer, skin breakdown, erythema, warmth or dry skin  Skin:     General: Skin is warm and dry  Capillary Refill: Capillary refill takes less than 2 seconds  Neurological:      General: No focal deficit present  Mental Status: He is alert and oriented to person, place, and time  Mental status is at baseline  Psychiatric:         Mood and Affect: Mood normal          Behavior: Behavior normal          Thought Content: Thought content normal          Judgment: Judgment normal        CBC and differential  Order: [id number]  Status:  Final result   Visible to patient:  Yes (Breanna Veras)   Next appt:  06/23/2021 at 04:30 PM in Family Medicine (Andrew Nevarez MD)   Dx:  Type 2 diabetes mellitus with hypergl      0 Result Notes   Ref Range & Units 5/29/21 10:13 AM   WBC 4 31 - 10 16 Thousand/uL 9 77    RBC 3 88 - 5 62 Million/uL 4 39    Hemoglobin 12 0 - 17 0 g/dL 13 4    Hematocrit 36 5 - 49 3 % 42 7    MCV 82 - 98 fL 97    MCH 26 8 - 34 3 pg 30 5    MCHC 31 4 - 37 4 g/dL 31 4    RDW 11 6 - 15 1 % 12 0    MPV 8 9 - 12 7 fL 10 7    Platelets 212 - 341 Thousands/uL 214    nRBC /100 WBCs 0       Narrative    This is an appended report  Bhumika Busch results have been appended to a previously verified report  Specimen Collected: 05/29/21 10:13 AM   Last Resulted: 05/29/21  4:39 PM            Contains abnormal data Comprehensive metabolic panel  Order: [id number]  Status:  Final result   Visible to patient:  Yes ([employer])   Next appt:  06/23/2021 at 04:30 PM in Family Medicine (Kathi Barr MD)   Dx:  Essential hypertension; Type 2 diabet      0 Result Notes   Ref Range & Units 5/29/21 10:13 AM   Sodium 136 - 145 mmol/L 137    Potassium 3 5 - 5 3 mmol/L 5 0    Chloride 100 - 108 mmol/L 105    CO2 21 - 32 mmol/L 31    ANION GAP 4 - 13 mmol/L 1Low     BUN 5 - 25 mg/dL 22    Creatinine 0 60 - 1 30 mg/dL 0 77    Comment: Standardized to IDMS reference method   Glucose, Fasting 65 - 99 mg/dL 143High     Comment: Specimen collection should occur prior to Sulfasalazine administration due to the potential for falsely depressed results  Specimen collection should occur prior to Sulfapyridine administration due to the potential for falsely elevated results  Calcium 8 3 - 10 1 mg/dL 9 4    AST 5 - 45 U/L 21    Comment: Specimen collection should occur prior to Sulfasalazine administration due to the potential for falsely depressed results  ALT 12 - 78 U/L 40    Comment: Specimen collection should occur prior to Sulfasalazine and/or Sulfapyridine administration due to the potential for falsely depressed results  Alkaline Phosphatase 46 - 116 U/L 75    Total Protein 6 4 - 8 2 g/dL 7 5    Albumin 3 5 - 5 0 g/dL 3 9    Total Bilirubin 0 20 - 1 00 mg/dL 0 64    Comment: Use of this assay is not recommended for patients undergoing treatment with eltrombopag due to the potential for falsely elevated results     eGFR ml/min/1 73sq m [place]  14  guidelines for Chronic Kidney Disease (CKD):     Stage 1 with normal or high GFR (GFR > 90 mL/min/1 73 square meters)     Stage 2 Mild CKD (GFR = 60-89 mL/min/1 73 square meters)     Stage 3A Moderate CKD (GFR = 45-59 mL/min/1 73 square meters)     Stage 3B Moderate CKD (GFR = 30-44 mL/min/1 73 square meters)     Stage 4 Severe CKD (GFR = 15-29 mL/min/1 73 square meters)     Stage 5 End Stage CKD (GFR <15 mL/min/1 73 square meters)   Note: GFR calculation is accurate only with a steady state creatinine      Specimen Collected: 05/29/21 10:13 AM   Last Resulted: 05/29/21  2:16 PM           TSH, 3rd generation with Free T4 reflex  Order: [id number]  Status:  Final result   Visible to patient:  Yes ([facility])   Next appt:  06/23/2021 at 04:30 PM in Family Medicine (Andrew Nevarez MD)   Dx:  Essential hypertension   0 Result Notes   Ref Range & Units 5/29/21 10:13 AM   TSH 3RD GENERATON 0 358 - 3 740 uIU/mL 1 320       Narrative    Patients undergoing fluorescein dye angiography may retain small amounts of fluorescein in the body for 48-72 hours post procedure  Samples containing fluorescein can produce falsely depressed TSH values  If the patient had this procedure,a specimen should be resubmitted post fluorescein clearance  Specimen Collected: 05/29/21 10:13 AM   Last Resulted: 05/29/21  2:16 PM           Contains abnormal data Hemoglobin A1C  Order: [id number]  Status:  Final result   Visible to patient:  Yes ([address])   Next appt:  06/23/2021 at 04:30 PM in Family Medicine (Andrew Nevarez MD)   Dx:  Type 2 diabetes mellitus with hypergl      0 Result Notes   Ref Range & Units 5/29/21 10:13 AM   Hemoglobin A1C Normal 3 8-5 6%; PreDiabetic 5 7-6 4%;  Diabetic >=6 5%; Glycemic control for adults with diabetes <7 0% % 8 0High     EAG mg/dl 183          Specimen Collected: 05/29/21 10:13 AM   Last Resulted: 05/29/21  2:10 PM         Vitamin D 25 hydroxy  Order: [id number]  Status:  Final result   Visible to patient:  Yes ([address])   Next appt:  06/23/2021 at 04:30 PM in Family Medicine (Michael Mirza MD)   Dx:  Vitamin D deficiency   0 Result Notes   Ref Range & Units 5/29/21 10:13 AM   Vit D, 25-Hydroxy 30 0 - 100 0 ng/mL 32 8       Narrative    This assay is a certified procedure of the CDC Vitamin D Standardization Certification Program (VDSCP)     Deficiency <20ng/ml   Insufficiency 20-30ng/ml   Sufficient  ng/ml            Vitamin B12  Order: [id number]  Status:  Final result   Visible to patient:  Yes ([address])   Next appt:  06/23/2021 at 04:30 PM in Family Medicine (Gilberto Boswell MD)   Dx:  Cyanocobalamin deficiency   0 Result Notes   Ref Range & Units 5/29/21 10:13 AM   Vitamin B-12 100 - 900 pg/mL 798          Specimen Collected: 05/29/21 10:13 AM   Last Resulted: 05/29/21  2:30 PM

## 2021-06-23 ENCOUNTER — OFFICE VISIT (OUTPATIENT)
Dept: FAMILY MEDICINE CLINIC | Facility: CLINIC | Age: 46
End: 2021-06-23
Payer: COMMERCIAL

## 2021-06-23 VITALS
BODY MASS INDEX: 21.6 KG/M2 | TEMPERATURE: 97.5 F | DIASTOLIC BLOOD PRESSURE: 76 MMHG | SYSTOLIC BLOOD PRESSURE: 112 MMHG | HEART RATE: 89 BPM | OXYGEN SATURATION: 98 % | HEIGHT: 67 IN | WEIGHT: 137.6 LBS

## 2021-06-23 DIAGNOSIS — E13.319 RETINOPATHY DUE TO SECONDARY DIABETES (HCC): ICD-10-CM

## 2021-06-23 DIAGNOSIS — Z11.59 NEED FOR HEPATITIS C SCREENING TEST: ICD-10-CM

## 2021-06-23 DIAGNOSIS — E55.9 VITAMIN D DEFICIENCY: ICD-10-CM

## 2021-06-23 DIAGNOSIS — R80.9 MICROALBUMINURIA: ICD-10-CM

## 2021-06-23 DIAGNOSIS — H35.81 MACULAR RETINAL EDEMA: ICD-10-CM

## 2021-06-23 DIAGNOSIS — E11.65 TYPE 2 DIABETES MELLITUS WITH HYPERGLYCEMIA, WITHOUT LONG-TERM CURRENT USE OF INSULIN (HCC): Primary | ICD-10-CM

## 2021-06-23 DIAGNOSIS — E78.2 MIXED HYPERLIPIDEMIA: ICD-10-CM

## 2021-06-23 DIAGNOSIS — Z00.00 ANNUAL PHYSICAL EXAM: ICD-10-CM

## 2021-06-23 PROCEDURE — 99396 PREV VISIT EST AGE 40-64: CPT | Performed by: FAMILY MEDICINE

## 2021-06-23 RX ORDER — MULTIVITAMIN
1 TABLET ORAL DAILY
COMMUNITY

## 2021-06-23 RX ORDER — POLYMYXIN B SULFATE AND TRIMETHOPRIM 1; 10000 MG/ML; [USP'U]/ML
SOLUTION OPHTHALMIC
COMMUNITY
Start: 2021-06-15 | End: 2021-07-15 | Stop reason: ALTCHOICE

## 2021-06-23 NOTE — PATIENT INSTRUCTIONS

## 2021-06-23 NOTE — PROGRESS NOTES
[address] [facility] [place]    NAME: Monica Muniz  AGE: 50 y o  SEX: male  : 1975     DATE: 2021     Assessment and Plan:       Patient for complete physical   Discussed blood test results with patient hemoglobin a1c is 8  Next visit if still not better, will increase trulicity to 3 mg weekly  Up to date w all vaccines  Problem List Items Addressed This Visit        Endocrine    Type 2 diabetes mellitus ([facility] [facility] ) - Primary    Retinopathy due to secondary diabetes ([facility] [address] )    Relevant Medications    polymyxin b-trimethoprim (POLYTRIM) ophthalmic solution       Other    Macular retinal edema    Relevant Medications    polymyxin b-trimethoprim (POLYTRIM) ophthalmic solution    Hyperlipidemia    Microalbuminuria          Immunizations and preventive care screenings were discussed with patient today  Appropriate education was printed on patient's after visit summary  Counseling:  · Sexual health: discussed sexually transmitted diseases, partner selection, use of condoms, avoidance of unintended pregnancy, and contraceptive alternatives  No follow-ups on file  Chief Complaint:     Chief Complaint   Patient presents with    Physical Exam      History of Present Illness:     Adult Annual Physical   Patient here for a comprehensive physical exam  The patient reports no problems  Diet and Physical Activity  · Diet/Nutrition: well balanced diet  · Exercise: walking  Depression Screening  PHQ-9 Depression Screening    PHQ-9:   Frequency of the following problems over the past two weeks:           General Health  · Sleep: sleeps well  · Hearing: normal - bilateral   · Vision: goes for regular eye exams  · Dental: regular dental visits   Health  · Symptoms include: none     Review of Systems:     Review of Systems   Constitutional: Negative for activity change, appetite change, fatigue, fever and unexpected weight change  HENT: Negative for dental problem and trouble swallowing  Eyes: Negative for photophobia and visual disturbance  Respiratory: Negative for cough and chest tightness  Cardiovascular: Negative for chest pain, palpitations and leg swelling  Gastrointestinal: Negative for abdominal pain, constipation and vomiting  Endocrine: Negative for cold intolerance, polydipsia and polyuria  Genitourinary: Negative for difficulty urinating, frequency and urgency  Musculoskeletal: Negative for arthralgias, joint swelling, myalgias and neck pain  Skin: Negative for color change, rash and wound  Allergic/Immunologic: Negative for environmental allergies  Neurological: Negative for dizziness, weakness and numbness  Hematological: Does not bruise/bleed easily  Psychiatric/Behavioral: Negative for decreased concentration, dysphoric mood, self-injury, sleep disturbance and suicidal ideas        Past Medical History:     Past Medical History:   Diagnosis Date    Diabetes mellitus ([place] [address] )     Hyperlipidemia     Immunization deficiency 6/29/2020    Hep b nonimmune     Macular retinal edema     Microalbuminuria 6/29/2020    Retinopathy due to secondary diabetes ([place] [address] )     Type 2 diabetes mellitus (HCC)       Past Surgical History:     Past Surgical History:   Procedure Laterality Date    LIPOMA RESECTION      on back      Family History:     Family History   Problem Relation Age of Onset    Hypertension Father     Stroke Father     Cancer Maternal Uncle       Social History:     Social History     Socioeconomic History    Marital status: /Civil Union     Spouse name: Not on file    Number of children: Not on file    Years of education: 15    Highest education level: Not on file   Occupational History    Occupation: [employer]   Tobacco Use    Smoking status: Former Smoker     Packs/day: 0 25     Years: 5 00     Pack years: 1 25     Types: Cigarettes    Smokeless tobacco: Never Used   Vaping Use  Vaping Use: Never used   Substance and Sexual Activity    Alcohol use: Yes     Alcohol/week: 1 0 standard drinks     Types: 1 Cans of beer per week     Comment: occasional    Drug use: Never    Sexual activity: Yes     Partners: Female     Birth control/protection: I U D  Other Topics Concern    Not on file   Social History Narrative    · Most recent tobacco use screenin2019      · Exercise level:   None      · Diet:   Regular      · Alcohol intake:   Occasional      · Caffeine intake: Moderate      · Seat belts used routinely:   Yes      · Smoke alarm in home: Yes      · Date of last Colonoscopy:   no colonoscopy  Social Determinants of Health     Financial Resource Strain:     Difficulty of Paying Living Expenses:    Food Insecurity:     Worried About Running Out of Food in the Last Year:     [address] in the Last Year:    Transportation Needs:     Lack of Transportation (Medical):      Lack of Transportation (Non-Medical):    Physical Activity:     Days of Exercise per Week:     Minutes of Exercise per Session:    Stress:     Feeling of Stress :    Social Connections:     Frequency of Communication with Friends and Family:     Frequency of Social Gatherings with Friends and Family:     Attends [employer] Services:     Active Member of Clubs or Organizations:     Attends Club or Organization Meetings:     Marital Status:    Intimate Partner Violence:     Fear of Current or Ex-Partner:     Emotionally Abused:     Physically Abused:     Sexually Abused:       Current Medications:     Current Outpatient Medications   Medication Sig Dispense Refill    atorvastatin (LIPITOR) 20 mg tablet Take 1 tablet (20 mg total) by mouth daily 90 tablet 2    Dulaglutide (Trulicity) 1 5 VB/1 4KZ SOPN Inject 0 5 mL (1 5 mg total) under the skin once a week 12 pen 2    lisinopril (ZESTRIL) 2 5 mg tablet Take 1 tablet (2 5 mg total) by mouth daily 90 tablet 3    Multiple Vitamin (multivitamin) tablet Take 1 tablet by mouth daily      Omega-3 Fatty Acids (FISH OIL PO) Take by mouth      prednisoLONE acetate (PRED FORTE) 1 % ophthalmic suspension Apply 1 drop to eye      Synjardy XR  MG TB24 Take 1 tablet by mouth daily 90 tablet 2    acetaZOLAMIDE (DIAMOX) 500 mg capsule  (Patient not taking: Reported on 6/23/2021)      atropine (ISOPTO ATROPINE) 1 % ophthalmic solution  (Patient not taking: Reported on 6/23/2021)      polymyxin b-trimethoprim (POLYTRIM) ophthalmic solution  (Patient not taking: Reported on 6/23/2021)       No current facility-administered medications for this visit  Allergies:     No Known Allergies   Physical Exam:     /76 (BP Location: Left arm, Patient Position: Sitting, Cuff Size: Standard)   Pulse 89   Temp 97 5 °F (36 4 °C) (Tympanic)   Ht 5' 7" (1 702 m)   Wt 62 4 kg (137 lb 9 6 oz)   SpO2 98%   BMI 21 55 kg/m²     Physical Exam  Vitals and nursing note reviewed  Constitutional:       Appearance: Normal appearance  He is well-developed and normal weight  HENT:      Head: Normocephalic and atraumatic  Right Ear: Tympanic membrane, ear canal and external ear normal       Left Ear: Tympanic membrane, ear canal and external ear normal       Nose: Nose normal       Mouth/Throat:      Mouth: Mucous membranes are dry  Pharynx: Oropharynx is clear  Eyes:      Conjunctiva/sclera: Conjunctivae normal    Cardiovascular:      Rate and Rhythm: Normal rate and regular rhythm  Heart sounds: No murmur heard  Pulmonary:      Effort: Pulmonary effort is normal  No respiratory distress  Breath sounds: Normal breath sounds  Abdominal:      General: Abdomen is flat  Bowel sounds are normal       Palpations: Abdomen is soft  Tenderness: There is no abdominal tenderness  Genitourinary:     Penis: Normal        Testes: Normal       Prostate: Normal       Rectum: Normal  Guaiac result negative     Musculoskeletal: General: Normal range of motion  Cervical back: Normal range of motion and neck supple  Skin:     General: Skin is warm and dry  Capillary Refill: Capillary refill takes less than 2 seconds  Neurological:      General: No focal deficit present  Mental Status: He is alert and oriented to person, place, and time  Mental status is at baseline  Psychiatric:         Mood and Affect: Mood normal          Behavior: Behavior normal          Thought Content:  Thought content normal          Judgment: Judgment normal           Andrew Nevarez MD  [facility]

## 2021-07-15 DIAGNOSIS — E11.649 UNCONTROLLED TYPE 2 DIABETES MELLITUS WITH HYPOGLYCEMIA, UNSPECIFIED HYPOGLYCEMIA COMA STATUS (HCC): ICD-10-CM

## 2021-07-15 RX ORDER — EMPAGLIFLOZIN, METFORMIN HYDROCHLORIDE 25; 1000 MG/1; MG/1
1 TABLET, EXTENDED RELEASE ORAL DAILY
Qty: 90 TABLET | Refills: 2 | Status: SHIPPED | OUTPATIENT
Start: 2021-07-15 | End: 2021-10-13 | Stop reason: SDUPTHER

## 2021-09-23 ENCOUNTER — OFFICE VISIT (OUTPATIENT)
Dept: FAMILY MEDICINE CLINIC | Facility: CLINIC | Age: 46
End: 2021-09-23
Payer: COMMERCIAL

## 2021-09-23 VITALS
HEIGHT: 67 IN | TEMPERATURE: 97.8 F | DIASTOLIC BLOOD PRESSURE: 64 MMHG | HEART RATE: 86 BPM | WEIGHT: 138.8 LBS | BODY MASS INDEX: 21.79 KG/M2 | OXYGEN SATURATION: 98 % | SYSTOLIC BLOOD PRESSURE: 112 MMHG

## 2021-09-23 DIAGNOSIS — E11.65 TYPE 2 DIABETES MELLITUS WITH HYPERGLYCEMIA, WITHOUT LONG-TERM CURRENT USE OF INSULIN (HCC): ICD-10-CM

## 2021-09-23 DIAGNOSIS — E11.649 UNCONTROLLED TYPE 2 DIABETES MELLITUS WITH HYPOGLYCEMIA, UNSPECIFIED HYPOGLYCEMIA COMA STATUS (HCC): ICD-10-CM

## 2021-09-23 DIAGNOSIS — Z23 NEED FOR INFLUENZA VACCINATION: Primary | ICD-10-CM

## 2021-09-23 LAB — SL AMB POCT HEMOGLOBIN AIC: 8.4 (ref ?–6.5)

## 2021-09-23 PROCEDURE — 99214 OFFICE O/P EST MOD 30 MIN: CPT

## 2021-09-23 PROCEDURE — 83036 HEMOGLOBIN GLYCOSYLATED A1C: CPT

## 2021-09-23 PROCEDURE — 90682 RIV4 VACC RECOMBINANT DNA IM: CPT

## 2021-09-23 PROCEDURE — 90471 IMMUNIZATION ADMIN: CPT

## 2021-09-23 RX ORDER — FLASH GLUCOSE SENSOR
KIT MISCELLANEOUS
Qty: 6 EACH | Refills: 3 | Status: SHIPPED | OUTPATIENT
Start: 2021-09-23

## 2021-09-23 RX ORDER — FLASH GLUCOSE SCANNING READER
EACH MISCELLANEOUS
Qty: 1 EACH | Refills: 1 | Status: SHIPPED | OUTPATIENT
Start: 2021-09-23

## 2021-09-23 NOTE — PROGRESS NOTES
Assessment/Plan:    Patient's blood sugars not well control at this point recommendation to increase Trulicity to 3 mg once a week continue Synjardy  Sample for freestyle Roderick 14 given to patient was same for recently rate to 4 continue glucose monitor  Will see him back in 3 months at next visit if sugar not better will recommend for endocrinologist evaluation  Flu vaccine given to him today  hba1c will be done in office next visit  I have spent 25 minutes with Patient and family today in which greater than 50% of this time was spent in counseling/coordination of care regarding Diagnostic results, Prognosis, Risks and benefits of tx options, Intructions for management, Patient and family education and Importance of tx compliance  Problem List Items Addressed This Visit        Endocrine    Type 2 diabetes mellitus (HCC)    Relevant Medications    Dulaglutide 3 MG/0 5ML SOPN    Other Relevant Orders    POCT hemoglobin A1c (Completed)      Other Visit Diagnoses     Need for influenza vaccination    -  Primary    Relevant Orders    influenza vaccine, quadrivalent, recombinant, PF, 0 5 mL, for patients 18 yr+ (FLUBLOK) (Completed)    Uncontrolled type 2 diabetes mellitus with hypoglycemia, unspecified hypoglycemia coma status (HCC)        Relevant Medications    Dulaglutide 3 MG/0 5ML SOPN    Continuous Blood Gluc  (FreeStyle Roman 2 [place]) LATRICIA    Continuous Blood Gluc Sensor (FreeStyle Roderick 2 Sensor) MISC            Subjective:      Patient ID: Scott Saxena is a 48 y o  male  18-year-old male follow-up type 2 diabetes uncontrolled  He is on Trulicity 1 5 mg once a week Synjardy 25/1000 daily lisinopril 2 5 mg daily for kidney protection and Lipitor 20 mg daily for lipidemia  Hemoglobin A1c last visit was 8 now in office hemoglobin A1c checked is 8 4  He has significant retinolopathy from type 2 diabetes  He had injection the eyes now laser  He watch what he eats very well    He has see endocrinologist in the past and had continuous glucose monitor and he did better  Now he has check his blood sugars twice a day and as needed for symptomatic  Patient plans on going to [place] in December to visit his aunt and his mom  He is up-to-date with COVID vaccine  The following portions of the patient's history were reviewed and updated as appropriate:   Past Medical History:  He has a past medical history of Diabetes mellitus ([facility] ), Hyperlipidemia, Immunization deficiency (6/29/2020), Macular retinal edema, Microalbuminuria (6/29/2020), Retinopathy due to secondary diabetes ([facility] ), and Type 2 diabetes mellitus ([facility] )  ,  _______________________________________________________________________  Medical Problems:  does not have any pertinent problems on file ,  _______________________________________________________________________  Past Surgical History:   has a past surgical history that includes Lipoma resection  ,  _______________________________________________________________________  Family History:  family history includes Cancer in his maternal uncle; Hypertension in his father; Stroke in his father ,  _______________________________________________________________________  Social History:   reports that he has quit smoking  His smoking use included cigarettes  He has a 1 25 pack-year smoking history  He has never used smokeless tobacco  He reports current alcohol use of about 1 0 standard drinks of alcohol per week  He reports that he does not use drugs  ,  _______________________________________________________________________  Allergies:  has No Known Allergies     _______________________________________________________________________  Current Outpatient Medications   Medication Sig Dispense Refill    atorvastatin (LIPITOR) 20 mg tablet Take 1 tablet (20 mg total) by mouth daily 90 tablet 2    lisinopril (ZESTRIL) 2 5 mg tablet Take 1 tablet (2 5 mg total) by mouth daily 90 tablet 3    Multiple Vitamin (multivitamin) tablet Take 1 tablet by mouth daily      Omega-3 Fatty Acids (FISH OIL PO) Take by mouth      Synjardy XR  MG TB24 Take 1 tablet by mouth daily 90 tablet 2    Continuous Blood Gluc  (FreeStyle Roman 2 [place]) LATRICIA 1 reader for CGM=type 2 diabetes with frequent hyperglycemia and hypoglycemia 1 each 1    Continuous Blood Gluc Sensor (FreeStyle Roderick 2 Sensor) MISC 2 sensors a month 6 each 3    Dulaglutide 3 MG/0 5ML SOPN Inject 0 5 mL (3 mg total) under the skin once a week 6 mL 2     No current facility-administered medications for this visit      _______________________________________________________________________  Review of Systems   Constitutional: Negative for activity change, appetite change, fatigue, fever and unexpected weight change  HENT: Negative for dental problem and trouble swallowing  Eyes: Negative for photophobia and visual disturbance  Respiratory: Negative for cough and chest tightness  Cardiovascular: Negative for chest pain, palpitations and leg swelling  Gastrointestinal: Negative for abdominal pain, constipation and vomiting  Endocrine: Negative for cold intolerance, polydipsia and polyuria  Genitourinary: Negative for difficulty urinating, frequency and urgency  Musculoskeletal: Negative for arthralgias, joint swelling, myalgias and neck pain  Skin: Negative for color change, rash and wound  Allergic/Immunologic: Negative for environmental allergies  Neurological: Negative for dizziness, weakness and numbness  Hematological: Does not bruise/bleed easily  Psychiatric/Behavioral: Negative for decreased concentration, dysphoric mood, self-injury, sleep disturbance and suicidal ideas           Objective:  Vitals:    09/23/21 1651   BP: 112/64   BP Location: Left arm   Patient Position: Sitting   Cuff Size: Standard   Pulse: 86   Temp: 97 8 °F (36 6 °C)   TempSrc: Tympanic   SpO2: 98%   Weight: 63 kg (138 lb 12 8 oz) Height: 5' 7" (1 702 m)     Body mass index is 21 74 kg/m²  Physical Exam  Vitals and nursing note reviewed  Constitutional:       Appearance: Normal appearance  He is normal weight  Pulmonary:      Effort: Pulmonary effort is normal    Neurological:      General: No focal deficit present  Mental Status: He is alert and oriented to person, place, and time  Mental status is at baseline  Psychiatric:         Mood and Affect: Mood normal          Behavior: Behavior normal          Thought Content:  Thought content normal          Judgment: Judgment normal

## 2021-09-26 DIAGNOSIS — E78.2 MIXED HYPERLIPIDEMIA: ICD-10-CM

## 2021-09-27 RX ORDER — ATORVASTATIN CALCIUM 20 MG/1
20 TABLET, FILM COATED ORAL DAILY
Qty: 90 TABLET | Refills: 0 | Status: SHIPPED | OUTPATIENT
Start: 2021-09-27 | End: 2022-01-10 | Stop reason: SDUPTHER

## 2021-10-05 DIAGNOSIS — R80.9 MICROALBUMINURIA: ICD-10-CM

## 2021-10-06 RX ORDER — LISINOPRIL 2.5 MG/1
2.5 TABLET ORAL DAILY
Qty: 90 TABLET | Refills: 0 | Status: SHIPPED | OUTPATIENT
Start: 2021-10-06 | End: 2022-01-10 | Stop reason: SDUPTHER

## 2021-10-13 DIAGNOSIS — E11.649 UNCONTROLLED TYPE 2 DIABETES MELLITUS WITH HYPOGLYCEMIA, UNSPECIFIED HYPOGLYCEMIA COMA STATUS (HCC): ICD-10-CM

## 2021-10-13 RX ORDER — EMPAGLIFLOZIN, METFORMIN HYDROCHLORIDE 25; 1000 MG/1; MG/1
1 TABLET, EXTENDED RELEASE ORAL DAILY
Qty: 90 TABLET | Refills: 0 | Status: SHIPPED | OUTPATIENT
Start: 2021-10-13 | End: 2022-01-10 | Stop reason: SDUPTHER

## 2021-10-19 ENCOUNTER — TELEPHONE (OUTPATIENT)
Dept: FAMILY MEDICINE CLINIC | Facility: CLINIC | Age: 46
End: 2021-10-19

## 2021-12-10 ENCOUNTER — IMMUNIZATIONS (OUTPATIENT)
Dept: FAMILY MEDICINE CLINIC | Facility: HOSPITAL | Age: 46
End: 2021-12-10

## 2021-12-10 DIAGNOSIS — Z23 ENCOUNTER FOR IMMUNIZATION: Primary | ICD-10-CM

## 2021-12-10 PROCEDURE — 0064A COVID-19 MODERNA VACC 0.25 ML BOOSTER: CPT

## 2021-12-10 PROCEDURE — 91306 COVID-19 MODERNA VACC 0.25 ML BOOSTER: CPT

## 2022-01-10 ENCOUNTER — OFFICE VISIT (OUTPATIENT)
Dept: FAMILY MEDICINE CLINIC | Facility: CLINIC | Age: 47
End: 2022-01-10
Payer: COMMERCIAL

## 2022-01-10 VITALS
OXYGEN SATURATION: 99 % | WEIGHT: 141 LBS | SYSTOLIC BLOOD PRESSURE: 122 MMHG | DIASTOLIC BLOOD PRESSURE: 86 MMHG | HEIGHT: 67 IN | HEART RATE: 80 BPM | TEMPERATURE: 97.8 F | BODY MASS INDEX: 22.13 KG/M2

## 2022-01-10 DIAGNOSIS — E11.65 TYPE 2 DIABETES MELLITUS WITH HYPERGLYCEMIA, WITHOUT LONG-TERM CURRENT USE OF INSULIN (HCC): ICD-10-CM

## 2022-01-10 DIAGNOSIS — H35.81 MACULAR RETINAL EDEMA: Primary | ICD-10-CM

## 2022-01-10 DIAGNOSIS — E11.649 UNCONTROLLED TYPE 2 DIABETES MELLITUS WITH HYPOGLYCEMIA, UNSPECIFIED HYPOGLYCEMIA COMA STATUS (HCC): ICD-10-CM

## 2022-01-10 DIAGNOSIS — R80.9 MICROALBUMINURIA: ICD-10-CM

## 2022-01-10 DIAGNOSIS — E78.2 MIXED HYPERLIPIDEMIA: ICD-10-CM

## 2022-01-10 LAB — SL AMB POCT HEMOGLOBIN AIC: 7.4 (ref ?–6.5)

## 2022-01-10 PROCEDURE — 83036 HEMOGLOBIN GLYCOSYLATED A1C: CPT | Performed by: FAMILY MEDICINE

## 2022-01-10 PROCEDURE — 99214 OFFICE O/P EST MOD 30 MIN: CPT | Performed by: FAMILY MEDICINE

## 2022-01-10 RX ORDER — ATORVASTATIN CALCIUM 20 MG/1
20 TABLET, FILM COATED ORAL DAILY
Qty: 90 TABLET | Refills: 0 | Status: SHIPPED | OUTPATIENT
Start: 2022-01-10 | End: 2022-03-06 | Stop reason: SDUPTHER

## 2022-01-10 RX ORDER — LISINOPRIL 2.5 MG/1
2.5 TABLET ORAL DAILY
Qty: 90 TABLET | Refills: 0 | Status: SHIPPED | OUTPATIENT
Start: 2022-01-10 | End: 2022-04-14 | Stop reason: SDUPTHER

## 2022-01-10 RX ORDER — EMPAGLIFLOZIN, METFORMIN HYDROCHLORIDE 25; 1000 MG/1; MG/1
1 TABLET, EXTENDED RELEASE ORAL DAILY
Qty: 90 TABLET | Refills: 0 | Status: SHIPPED | OUTPATIENT
Start: 2022-01-10 | End: 2022-01-12 | Stop reason: SDUPTHER

## 2022-01-10 NOTE — PROGRESS NOTES
Assessment/Plan:  A1c is 7 4 much improved compared to 8 4 before  He also had diabetic retinopathy that will be good for his eyes  Continue Trulicity 3 mg weekly Synjardy Lipitor  He is up-to-date with COVID vaccine and booster  In 6 months recommendation for another booster  Follow-up in 6 months with blood work and diabetic foot exam then  Recommendation for pneumonia vaccine every 5 years and shingle vaccine when he is 44years old  Meds refilled  I have spent 30 minutes with Patient and family today in which greater than 50% of this time was spent in counseling/coordination of care regarding Diagnostic results, Prognosis, Risks and benefits of tx options, Intructions for management, Patient and family education, Importance of tx compliance, Risk factor reductions and Impressions  Problem List Items Addressed This Visit        Endocrine    Type 2 diabetes mellitus (HCC)    Relevant Medications    Synjardy XR  MG TB24    Dulaglutide 3 MG/0 5ML SOPN       Other    Macular retinal edema - Primary    Hyperlipidemia    Relevant Medications    atorvastatin (LIPITOR) 20 mg tablet    Microalbuminuria    Relevant Medications    lisinopril (ZESTRIL) 2 5 mg tablet    Other Relevant Orders    Microalbumin,Urine    UA w Reflex to Microscopic w Reflex to Culture      Other Visit Diagnoses     Uncontrolled type 2 diabetes mellitus with hypoglycemia, unspecified hypoglycemia coma status (HCC)        Relevant Medications    Synjardy XR  MG TB24    Dulaglutide 3 MG/0 5ML SOPN    Other Relevant Orders    CBC and differential    Comprehensive metabolic panel    Hemoglobin A1C            Subjective:      Patient ID: Neal Miller is a 48 y o  male  Diabetes  He presents for his follow-up diabetic visit  He has type 2 diabetes mellitus  No MedicAlert identification noted  The initial diagnosis of diabetes was made 5 years ago  His disease course has been fluctuating   There are no hypoglycemic associated symptoms  Pertinent negatives for hypoglycemia include no dizziness  Associated symptoms include blurred vision and visual change  Pertinent negatives for diabetes include no chest pain, no fatigue, no polydipsia, no polyuria and no weakness  There are no hypoglycemic complications  Symptoms are improving  Diabetic complications include retinopathy  Risk factors for coronary artery disease include diabetes mellitus  Current diabetic treatment includes oral agent (dual therapy) (GLP1 injection)  He is compliant with treatment all of the time  His weight is stable  He is following a diabetic diet  Meal planning includes avoidance of concentrated sweets  He has not had a previous visit with a dietitian  He participates in exercise three times a week  His home blood glucose trend is decreasing steadily  His breakfast blood glucose is taken between 7-8 am  His breakfast blood glucose range is generally 70-90 mg/dl  His lunch blood glucose is taken between 12-1 pm  His lunch blood glucose range is generally 130-140 mg/dl  His dinner blood glucose is taken between 6-7 pm  His dinner blood glucose range is generally 140-180 mg/dl  His bedtime blood glucose is taken between 10-11 pm  His overall blood glucose range is  mg/dl  An ACE inhibitor/angiotensin II receptor blocker is not being taken  He does not see a podiatrist Eye exam is current  The following portions of the patient's history were reviewed and updated as appropriate:   Past Medical History:  He has a past medical history of Diabetes mellitus ([facility] ), Hyperlipidemia, Immunization deficiency (6/29/2020), Macular retinal edema, Microalbuminuria (6/29/2020), Retinopathy due to secondary diabetes ([facility] ), and Type 2 diabetes mellitus ([facility] )  ,  _______________________________________________________________________  Medical Problems:  does not have any pertinent problems on file ,  _______________________________________________________________________  Past Surgical History:   has a past surgical history that includes Lipoma resection  ,  _______________________________________________________________________  Family History:  family history includes Cancer in his maternal uncle; Hypertension in his father; Stroke in his father ,  _______________________________________________________________________  Social History:   reports that he has quit smoking  His smoking use included cigarettes  He has a 1 25 pack-year smoking history  He has never used smokeless tobacco  He reports current alcohol use of about 1 0 standard drink of alcohol per week  He reports that he does not use drugs  ,  _______________________________________________________________________  Allergies:  has No Known Allergies     _______________________________________________________________________  Current Outpatient Medications   Medication Sig Dispense Refill    atorvastatin (LIPITOR) 20 mg tablet Take 1 tablet (20 mg total) by mouth daily 90 tablet 0    Continuous Blood Gluc  (Jillian Roman 2 [place]) LATRICIA 1 reader for CGM=type 2 diabetes with frequent hyperglycemia and hypoglycemia 1 each 1    Continuous Blood Gluc Sensor (FreeStyle Roderick 2 Sensor) MISC 2 sensors a month 6 each 3    Dulaglutide 3 MG/0 5ML SOPN Inject 0 5 mL (3 mg total) under the skin once a week 6 mL 2    lisinopril (ZESTRIL) 2 5 mg tablet Take 1 tablet (2 5 mg total) by mouth daily 90 tablet 0    Multiple Vitamin (multivitamin) tablet Take 1 tablet by mouth daily      Omega-3 Fatty Acids (FISH OIL PO) Take by mouth      Synjardy XR  MG TB24 Take 1 tablet by mouth daily 90 tablet 0     No current facility-administered medications for this visit      _______________________________________________________________________  Review of Systems   Constitutional: Negative for activity change, appetite change, fatigue, fever and unexpected weight change  HENT: Negative for dental problem and trouble swallowing  Eyes: Positive for blurred vision  Negative for photophobia and visual disturbance  Respiratory: Negative for cough and chest tightness  Cardiovascular: Negative for chest pain, palpitations and leg swelling  Gastrointestinal: Negative for abdominal pain, constipation and vomiting  Endocrine: Negative for cold intolerance, polydipsia and polyuria  Genitourinary: Negative for difficulty urinating, frequency and urgency  Musculoskeletal: Negative for arthralgias, joint swelling, myalgias and neck pain  Skin: Negative for color change, rash and wound  Allergic/Immunologic: Negative for environmental allergies  Neurological: Negative for dizziness, weakness and numbness  Hematological: Does not bruise/bleed easily  Psychiatric/Behavioral: Negative for decreased concentration, dysphoric mood, self-injury, sleep disturbance and suicidal ideas  Objective:  Vitals:    01/10/22 1759   BP: 122/86   BP Location: Left arm   Patient Position: Sitting   Cuff Size: Standard   Pulse: 80   Temp: 97 8 °F (36 6 °C)   TempSrc: Tympanic   SpO2: 99%   Weight: 64 kg (141 lb)   Height: 5' 7" (1 702 m)     Body mass index is 22 08 kg/m²  Physical Exam  Vitals and nursing note reviewed  Constitutional:       Appearance: Normal appearance  HENT:      Head: Normocephalic and atraumatic  Pulmonary:      Effort: Pulmonary effort is normal    Neurological:      General: No focal deficit present  Mental Status: He is alert and oriented to person, place, and time  Mental status is at baseline  Psychiatric:         Mood and Affect: Mood normal          Behavior: Behavior normal          Thought Content:  Thought content normal          Judgment: Judgment normal

## 2022-01-10 NOTE — PATIENT INSTRUCTIONS
10% - bad control"> 10% - bad control,Hemoglobin A1c (HbA1c) greater than 10% indicating poor diabetic control,Haemoglobin A1c greater than 10% indicating poor diabetic control">   Diabetes mellitus tipo 2 en adultos, cuidados ambulatorios   INFORMACIÓN GENERAL:   La diabetes mellitus tipo 2 en adultos  es karen enfermedad que afecta la forma en que el cuerpo utiliza la glucosa (azúcar)  La insulina ayuda a extraer el azúcar de la brad para que pueda usarse en la producción de energía  Generalmente, cuando el nivel de azúcar [place], el páncreas produce más insulina  La diabetes tipo 2 se desarrolla ya sea porque el cuerpo no puede producir suficiente insulina, o no la puede usar correctamente  Después de [employer], hale páncreas podría dejar de producir insulina  Síntomas comunes incluyen los siguientes:   · Más hambre o sed de la usual    · Necesidad frecuente de orinar     · Pérdida de peso sin tratar     · Visión borrosa  Busque cuidados inmediatos para los siguientes síntomas:   · Dolor abdominal severo o dolor que se propaga hacia hale espalda  Es probable que usted también vomite  · Dificultad para permanecer despierto o concentrado    · Temblores o sudoración    · Visión borrosa o doble    · Aliento con olor a frutas o stelal    · Respiración profunda y dificultosa o rápida y superficial    · Ritmo cardíaco rápido y débil  El tratamiento para la diabetes mellitus tipo 2  incluye mantener hale nivel de azúcar en el brad a un rango normal  Usted debe comer los alimentos correctos y ejercitarse con regularidad  Además es probable que necesite medicamentos si no puede controlar hale nivel de azúcar en la brad con nutrición y ejercicio  Maneje la diabetes mellitus tipo 2:   · Revise hale nivel de azúcar en la brad  A usted le enseñarán cómo revisar karen pequeña gota de Toni blunt en un medidor de glucosa  Pregúntele a hale proveedor de pernell cuándo y con cuánta frecuencia es necesario revisar nick el día  También pregúntele a hale proveedor de pernell cuáles deberían ser julianna niveles de azúcar en la brad cuando usted se los [place]  · Mantenga un registro de los carbohidratos (azúcares y almidones)  Hale nivel de azúcar en la brad puede elevarse demasiado si usted come demasiados carbohidratos  Hale dietista le ayudará a planear comidas y meriendas que tengan la cantidad correcta de carbohidratos  · Consuma alimentos bajos en grasas  Adebayo Robe son el gareth sin piel y la leche descremada  · Consuma menos sodio (sal)  Algunos ejemplos de alimentos altos en sodio que hay que limitar son la salsa de soya, las gerardo tostadas y la sopa  No le agregue sal a la comida que usted cocina  Limite hale uso de sal de nevarez  · Coma alimentos altos en fibra  Alimentos que son buena latasha de fibra incluyen los vegetales, el pan integral y los frijoles  · Limite el alcohol  El alcohol afecta hale nivel de azúcar en la brad y puede dificultar el Filion de hale diabetes  Las mujeres deben limitar el consumo de alcohol a 1 bebida al día  Los hombres deben limitarlo a 2 debidas al día  Karen bebida de alcohol equivale a 12 onzas de cerveza, 5 onzas de vino o 1½ onzas de licor  · Ejercítese regularmente  El ejercicio puede ayudar a mantener estable hale nivel de azúcar en la brad, al mismo tiempo que disminuye hale riesgo de enfermedad cardíaca y le ayuda a perder peso  Ejercítese por al menos 30 minutos, 5 días a la semana  Agustin Hard de fortalecimiento muscular 2 días a la semana  Colabore con hale proveedor de pernell para crear un plan de ejercicios  · Revise julianna pies a diario  para mike si tienen heridas o llagas abiertas  Pregúntele a hale proveedor de [employer] que usted puede hacer si tiene karen llaga abierta  · Deje de fumar  Si usted fuma, nunca es tarde para dejar de hacerlo  El fumar puede [employer] problemas que puedan ocurrir con la diabetes   Pregúntele a hale proveedor de [place] sobre información para aprender a dejar de fumar si usted tiene dificultad para hacerlo  · Pregunte sobre robertson peso:  Pregúntele a julianna proveedores de pernell si usted necesita perder peso y cuánto debe perder  Pídales que le ayuden con un programa de perdida de [place]  Incluso perder unas 10 a 15 libras puede ayudarle a manejar robertson nivel de azúcar en la brad  · Tenga a mano robertson identificación de [employer]  Use un brazalete de alerta médica o lleve consigo karen tarjeta que diga que usted tiene diabetes  Pregúntele a robertson proveedor de pernell dónde conseguir estos artículos  · Pregunte sobre vacunas  La diabetes lo pone a usted en riesgo de enfermedad seria si usted se resfría, tiene neumonía o hepatitis  Pregúntele a robertson proveedor de pernell si usted debe ponerse las vacunas contra la gripe, neumonía o hepatitis B, y cuándo ponérselas  Programe karen freya con robertson proveedor de [employer] se le haya indicado: Anote julianna preguntas para que se acuerde de hacerlas nick julianna visitas  ACUERDOS SOBRE ROBERTSON CUIDADO:   Usted tiene el derecho de participar en la planificación de robertson cuidado  Aprenda todo lo que pueda sobre robertson condición y darcie darle tratamiento  Discuta con julianna médicos julianna opciones de tratamiento para juntos decidir el cuidado que usted quiere recibir  Usted siempre tiene el derecho a rechazar robertson tratamiento  Esta información es sólo para uso en educación  Robertson intención no es darle un consejo médico sobre enfermedades o tratamientos  Colsulte con robertson [place] farmacéutico antes de seguir cualquier régimen médico para saber si es seguro y efectivo para usted  © [id number] is for End User's use only and may not be sold, redistributed or otherwise used for commercial purposes  All illustrations and images included in CareNotes® are the copyrighted property of A D A M , Inc  or Gurjit Talaverao básico de carbohidratos   CUIDADO AMBULATORIO:   El judeo de carbohidratos es [facility] de planificar julianna comidas contando la cantidad de carbohidratos de los alimentos  Vernia Susan son los azúcares, almidones y fibras que se encuentran en las frutas, granos, verduras y productos lácteos  Los carbohidratos [employer] niveles de azúcar en la brad  El conteo de carbohidratos puede ayudarle a comer la cantidad [place] de carbohidratos para mantener julianna niveles de glucosa (azúcar) en brad bajo control  Lo que necesita saber sobre la planificación de las comidas utilizando el conteo de carbohidratos:  · Un dietista o un médico le ayudará a desarrollar un plan alimenticio saludable que trabajará mejor para usted  Le enseñarán cuántos carbohidratos debe consumir o beber en cada comida o merienda  Hale plan alimenticio estará basado en hale edad, peso, dieta usual y [facility]  Si usted tiene diabetes, también incluirá hale nivel de azúcar en brad y medicamentos para la diabetes  Cuando usted está informado de la cantidad de carbohidratos que debe consumir, entonces puede decidir los tipos de alimentos que quiere comer  · Necesitará saber qué alimentos contienen carbohidratos y cuántos contienen  Lleva la cuenta de la cantidad de carbohidratos en alimentos y meriendas para poder seguir hale plan alimenticio  No evite los carbohidratos ni omita comidas  Si usted no consume suficiente cantidad de carbohidratos u omite comidas, los niveles de azúcar en hale brad pueden bajar excesivamente  Alimentos que contienen carbohidratos:  · Panes: Cada porción de comida en la lista siguiente contiene cerca de 15 g de carbohidratos     ? 1 rebanada de pan (1 onza) o 1 tortilla de harina o maíz (6 pulgadas)    ? La ½ de pan para hamburguesa o ¼ de karen rosquilla barrett (aproximadamente 1 onza)    ? 1 panqueque (4 pulgadas en diámetro y ¼ de [place])    · Cereales y granos: El tamaño de las porciones de cereales listos para comer puede variar   Louann el tamaño de la porción y la cantidad de carbohidratos alistados en la etiqueta alimenticia  Cada porción de comida en la lista siguiente contiene cerca de 15 g de carbohidratos     ? ¾ taza de cereal seco, sin endulzar, listo para comer o ¼ taza de granola baja en grasa    ? ½ taza de billie u otros cereales cocidos    ? ? taza de arroz o pasta cocida    · Frijoles y vegetales con almidón: Cada porción de comida en la lista siguiente contiene cerca de 15 g de carbohidratos     ? ½ de taza de maíz, chícharos verdes, camote o puré de papa    ? ¼ de karen papa barrett horneada    ? ½ taza de frijoles, lentejas y guisantes (garbanzo, obrien, habichuela, bucio, partido, de ayala thor)    · Gilbert y meriendas: Cada porción de comida en la lista siguiente contiene cerca de 15 g de carbohidratos     ? 3 galletas de harina cuadradas u 8 galletas en forma de animalitos    ? 6 galletas saladas    ? 3 tazas de palomitas de maíz o ¾ onzas de pretzels, gerardo fritas o totopos    · Frutas: Cada porción de comida en la lista siguiente contiene cerca de 15 g de carbohidratos     ? 1 pieza pequeña (4 onzas) de fruta fresca o ¾ a 1 taza de fruta fresca    ? ½ taza de fruta enlatada o congelada, envasada en jugo natural    ? ½ taza (4 onzas) de [place] de fruta sin azúcar    ? 2 cucharadas de fruta seca    · Alimentos azucarados o postres: Cada porción de comida en la lista siguiente contiene cerca de 15 g de carbohidratos     ? 1 sheba de 2 pulgadas de pastel sin glaseado o brownie    ? 2 galletas dulces pequeñas    ? ½ taza de helado, yogur congelado o yogur congelado sin lactosa    ? ¼ de taza de sorbete    ? 1 cucharada de [place] regular, mermelada o jalea    ? 2 cucharadas de jarabe ligero    · Leche y yogur: Darene Alejandra contienen cerca de 12 g de carbohidratos por ración  ? 1 taza de leche sin grasa o baja en grasa    ?  [address] soja    ? 1 taza de yogur sin Kevin Mustache que razo sido endulzado con endulzante artificial    · Verduras sin almidón: Cada porción de comida contiene cerca de 5 g de carbohidratos Patricio porciones de vegetales sin almidón cuentan darcie 1 porción de carbohidratos  ? ½ de taza de verduras cocidas o 1 taza de verduras crudas Estas incluyen remolachas, bróculi, repollo, coliflor, pepino, champiñones, tomates y calabaza  ? ½ taza de jugo de verduras    Cómo utilizar el conteo de carbohidratos para planificar las comidas:  · [employer] las cantidades de carbohidratos usando el tamaño de las porciones:     ? Ejemplo de karen dania de pasta: Planifica comer pasta, ensalada mixta y un vaso de 8 onzas de [place]  Mendoza médico le dice que puede consumir 4 porciones de carbohidratos para la dania  Karen porción de carbohidratos de pasta es ? de taza  Karen taza de pasta será igual a 3 porciones de carbohidratos  Un vaso de 8 onzas de leche se cuenta darcie 1 porción de carbohidratos  Estas cantidades de alimentos serían iguales a 4 porciones de carbohidratos  Karen taza de ensalada mixta no cuenta para las porciones de carbohidratos  · Cuente la cantidad de carbohidratos utilizando las etiquetas alimenticias: Busque la cantidad total de los carbohidratos en los alimentos envasados leyendo la etiqueta alimenticia  Las etiquetas alimenticias explican el tamaño de la porción del alimento y la cantidad total de los carbohidratos en cada porción  Busque el tamaño de la porción en la etiqueta alimenticia y después decida cuántas porciones comerá  Multiplique el número de porciones que planea comer por la cantidad de carbohidratos por porción  ? Ejemplo de karen merienda con Robson ferris: Mendoza plan de comidas le permite consumir 2 porciones de carbohidratos (30 gramos) darcie bocadillo  Planea comer 1 paquete de barras de granola, el cual contiene 2 barras  Según la etiqueta alimenticia, el tamaño de la porción en maday paquete es 1 diony  Cada porción (1 diony) contiene 25 gramos de carbohidratos   La cantidad total de carbohidratos en el paquete de barras de granola sería 50 gramos  Basándose en hale plan alimenticio, usted debe de comer sólo 1 diony de granola  Acuda a la consulta de control con hale médico según las indicaciones: Anote julianna preguntas para que se acuerde de hacerlas nick julianna visitas  © [employer] 2021 Information is for End User's use only and may not be sold, redistributed or otherwise used for commercial purposes  All illustrations and images included in CareNotes® are the copyrighted property of A D A Kopjra  or [address] [place] [address] es sólo para uso en educación  Hale intención no es darle un consejo médico sobre enfermedades o tratamientos  Colsulte con hale [place] farmacéutico antes de seguir cualquier régimen médico para saber si es seguro y efectivo para usted  Cuidado del pie para personas con diabetes   CUIDADO AMBULATORIO:   Lo que usted necesita saber acerca del cuidado del pie:  · El cuidado del pie ayuda a proteger hale pies y evitar úlceras o llagas en el pie  Los [employer] de azúcar en la brad a sari plazo pueden dañar los vasos sanguíneos y los nervios en julianna piernas y pies  Zoya daño dificulta que sienta presión, dolor, temperatura y el tacto  Es posible que usted no sienta karen cortada o karen úlcera o que los zapatos están muy apretados  El cuidado del pie es necesario para evitar problemas graves, darcie karen infección o karen amputación  · La diabetes podría provocar que los dedos de julianna pies se tuerzan o se encorven [place]  Estos cambios podrían afectar la manera en que usted camina y pueden conllevar al aumento de la presión en hale pie  La presión puede disminuir el flujo sanguíneo a julianna pies  La falta del flujo sanguíneo aumenta hale riesgo de karen úlcera en [employer]  No ignore problemas pequeños, darcie la piel reseca o heridas pequeñas   Con el tiempo, estos puede representar karen amenaza para la earlene si no se les [employer] cuidado apropiado  Llame al proveedor del equipo de cuidados de pernell si:  · Lesley pies se ponen entumecidos, débiles o difícil de   · Usted tiene pus drenando de karen llaga en hale pie  · Usted tiene karen herida en hale pie que se hace más barrett, más profunda o que no germaine  · Usted nota que tiene ampollas, cortadas, rasguños, callos o llagas en hale pie  · Usted tiene fiebre y lesley pies se ponen rojos, calientes e inflamados  · Las uñas de lesley pies se vuelven gruesas, encorvadas o [id number]  · Le es difícil revisarse los pies porque hale visión no está merissa  · Usted tiene preguntas o inquietudes acerca de hale condición o cuidado  Cómo cuidar de lesley pies:  · Revísese los pies a diario  Observe todo el pie, incluyendo la planta del pie, entre y [employer] dedos  Revise si hay heridas y callos  Use un nabor para verse la planta de los pies  La piel de los pies podría estar brillante, estirada o más obscura de lo normal  Lesley pies también podrían estar fríos y pálidos  Pase lesley kwesi por encima, por debajo, por los lados y [place] dedos del pie para sentir la piel  El enrojecimiento, inflamación y calor son signos de problemas con el flujo sanguíneo que pueden conllevar a karen úlcera en el pie  No trate de quitarse los callos usted mismo  · [employer] todos los días con agua tibia y [place]  No use [place], porque esto puede lesionarle el pie  Séquese los pies suavemente con karen toalla después de lavarlos  Seque entre y [employer] dedos  · Aplique karen loción o un humectante sobre lesley pies secos  Pregunte al médico del equipo de cuidados de pernell cuáles lociones son las mejores que puede usar  No  aplique loción o humectante entre lesley dedos  La [employer] dedos del pie podría causar rupturas de la piel  · [place] uñas de los pies correctamente  Lime o ashleigh las uñas de los pies en línea recta  Use un cepillo suave para limpiar alrededor [employer]   [address] gruesas, es posible que necesite que un médico del equipo de cuidados de pernell o un especialista se las ashleigh  · Proteja julianna pies  No  camine descalzo ni use zapatos sin calcetines  Compruebe que no haya piedras u otros objetos dentro de julianna zapatos que le podrían [employer]  Use calcetines de algodón para ayudar a [place] [employer]  Use calcetines sin costura en los dedos o póngaselos con la costura hacia afuera  Cámbiese los calcetines diariamente  No use calcetines sucios ni húmedos  · Use zapatos que le calcen merissa  Use zapatos que no rocen ninguna parte de julianna pies  Hale calzado debería ser de ½ a ¾ pulgada (1 a 2 centímetros) más grandes que julianna pies  Hale calzado también debería tener espacio adicional alrededor de la parte más ancha de julianna pies  El calzado para caminar o atlético con cordones o correas que se ajustan son los mejores  Pida ayuda al médico del equipo de cuidados de pernell para elegir un par de zapatos que le calcen merissa  Pregúntele si debe usar algún tipo de plantilla, soporte o vendaje en julianna pies  · Asista a jluianna citas de seguimiento  El médico del equipo de cuidados de pernell lonnie [place] revisión a julianna pies al menos karen vez al Mihir  Es posible que usted necesite hacerse un examen de pie más seguido si tiene los nervios dañados, deformidad en el pie o [place]  Él revisará si hay daño al nervio y qué tan merissa usted puede sentir julianna pies  Él le revisará hale calzado para mike si le [employer]  · No fume  Fumar puede dañar los vasos sanguíneos y aumentar hale riesgo de úlceras en los pies  Pida al médico de hale equipo de cuidados de pernell información si usted fuma actualmente y [place] para dejar de hacerlo  Los cigarrillos electrónicos o el tabaco sin humo igualmente contienen nicotina  Consulte con hale médico del equipo de cuidados de pernell antes de utilizar estos productos      Acuda a julianna consultas de control con el médico del equipo de cuidado de la diabetes o un especialista en pies según le indicaron: Usted va a necesitar que le revisen julianna pies al menos karen vez al Mihir  Es posible que usted necesite hacerse un examen de pie más seguido si tiene los nervios dañados, deformidad en el pie o [place]  Anote julianna preguntas para que se acuerde de hacerlas nick julianna visitas  © Copyright [employer] 2021 Information is for End User's use only and may not be sold, redistributed or otherwise used for commercial purposes  All illustrations and images included in CareNotes® are the copyrighted property of A D A Parrable  or [address] [place] [address] es sólo para uso en educación  Hale intención no es darle un consejo médico sobre enfermedades o tratamientos  Colsulte con hale [place] farmacéutico antes de seguir cualquier régimen médico para saber si es seguro y efectivo para usted  Laverna Kehr si hale nivel de azúcar en la brad es bajo   CUIDADO AMBULATORIO:   Niveles bajos de azúcar en la brda (hipoglucemia) puede ocurrir con la diabetes tipo 1 y tipo 2  Los niveles bajos son más probables de ocurrir si usted [place] insulina  La hipoglucemia puede hacer que usted tenga caídas, accidentes y lesiones  Un nivel de azúcar en la brad que llega a estar demasiado bajo puede causar convulsiones, coma y la muerte  Aprenda a reconocer los síntomas temprano para que pueda obtener tratamiento rápidamente  Cuando hale nivel de azúcar en la brad es [place] sentirse:  · Sudoroso    · Nervioso o tembloroso    · Ansioso o irritable    · Confundido    · Tener latidos cardíacos rápidos o edwar    · Extremadamente hambriento    Pídale a alguien que llame al número de emergencias local (911 en los [place]) si:  · No es posible despertarlo  · Usted sufre karen convulsión  Llame a hale médico si:  · Tiene síntomas de un nivel de baja azúcar en la brad, tales darcie problemas pensando en sudoración y un latido hi      · Hale nivel de azúcar en la brad está por debajo de lo normal y no mejora con el tratamiento  · Usted a menudo tiene niveles de azúcar en la brad más bajos que julianna metas fijadas  · Usted tiene problemas para sobrellevar hale enfermedad, o se siente ansioso o deprimido  · Usted tiene preguntas o inquietudes acerca de hale condición o cuidado  Qué hacer si tiene síntomas de nivel bajo de azúcar en la brad:  · Compruebe hale nivel de azúcar en la brad, si es posible  Hale nivel de azúcar en brad está demasiado bajo si llega a menos de 70 mg/dl  · Coma o yue 15 gramos de carbohidrato de acción rápida  Los carbohidratos de acción rápida aumentarán hale nivel de azúcar en la brad enseguida  Ejemplos de 15 gramos de carbohidrato de acción rápida:     ? 4 onzas (½ taza) de jugo de fruta    ? 4 onzas de gaseosa regular    ? 2 cucharadas de uvas pasas    ? 1 tubo de gel de glucosa o de 3 a 4 tabletas de glucosa       · Revise hale nivel de azúcar en la brad 15 minutos más tarde  Si el nivel es todavía bajo (menos de 100 mg/dL), ingiera otros 15 gramos de carbohidratos  Cuando el nivel vuelva a 100 mg/dL, coma un refrigerio o alimento que contenga carbohidratos  [place] ayudará a evitar otra caída de hale nivel de azúcar en la brad  · Enseñe a las personas cercanas a usted cómo utilizar el kit de glucagón  Hale nivel de azúcar en la brad puede estar demasiado bajo para que usted esté despierto  Las personas necesitan saber cuándo y cómo utilizar el kit  Evite los niveles bajos de azúcar en la brad: Evite los niveles bajos de azúcar en la brad sabiendo lo que [place]  Consulte con hale médico cuáles son las maneras de [employer] niveles bajos de azúcar en la brad   [place] de los siguientes factores puede aumentar hale riesgo de niveles bajos de azúcar en la brad:  · Ayuno para realizarse pruebas o procedimientos    · [place] o después del ejercicio intenso    · Comidas tardias o pospuestas    · Dormir (puede que necesite un refrigerio antes de dennisir)    · Beber alcohol si [place] insulina o píldoras liberadoras de insulina    Acuda a la consulta de control con hale médico según las indicaciones: Anote julianna preguntas para que se acuerde de hacerlas nick julianna visitas  © Copyright [employer] 2021 Information is for End User's use only and may not be sold, redistributed or otherwise used for commercial purposes  All illustrations and images included in CareNotes® are the copyrighted property of A D A M , Inc  or [address] [place] [address] es sólo para uso en educación  Hale intención no es darle un consejo médico sobre enfermedades o tratamientos  Colsulte con hale Class[place] farmacéutico antes de seguir cualquier régimen médico para saber si es seguro y efectivo para usted

## 2022-01-12 DIAGNOSIS — E11.649 UNCONTROLLED TYPE 2 DIABETES MELLITUS WITH HYPOGLYCEMIA, UNSPECIFIED HYPOGLYCEMIA COMA STATUS (HCC): ICD-10-CM

## 2022-01-14 RX ORDER — EMPAGLIFLOZIN, METFORMIN HYDROCHLORIDE 25; 1000 MG/1; MG/1
1 TABLET, EXTENDED RELEASE ORAL DAILY
Qty: 90 TABLET | Refills: 0 | Status: SHIPPED | OUTPATIENT
Start: 2022-01-14 | End: 2022-04-14 | Stop reason: SDUPTHER

## 2022-01-27 DIAGNOSIS — E11.649 UNCONTROLLED TYPE 2 DIABETES MELLITUS WITH HYPOGLYCEMIA, UNSPECIFIED HYPOGLYCEMIA COMA STATUS (HCC): ICD-10-CM

## 2022-02-16 DIAGNOSIS — E11.65 TYPE 2 DIABETES MELLITUS WITH HYPERGLYCEMIA, WITHOUT LONG-TERM CURRENT USE OF INSULIN (HCC): Primary | ICD-10-CM

## 2022-02-16 RX ORDER — INSULIN ASPART 100 [IU]/ML
INJECTION, SOLUTION INTRAVENOUS; SUBCUTANEOUS
Qty: 15 ML | Refills: 1 | Status: SHIPPED | OUTPATIENT
Start: 2022-02-16

## 2022-03-06 DIAGNOSIS — E78.2 MIXED HYPERLIPIDEMIA: ICD-10-CM

## 2022-03-07 RX ORDER — ATORVASTATIN CALCIUM 20 MG/1
20 TABLET, FILM COATED ORAL DAILY
Qty: 90 TABLET | Refills: 0 | Status: SHIPPED | OUTPATIENT
Start: 2022-03-07 | End: 2022-06-26 | Stop reason: SDUPTHER

## 2022-04-14 DIAGNOSIS — E11.649 UNCONTROLLED TYPE 2 DIABETES MELLITUS WITH HYPOGLYCEMIA, UNSPECIFIED HYPOGLYCEMIA COMA STATUS (HCC): ICD-10-CM

## 2022-04-14 DIAGNOSIS — R80.9 MICROALBUMINURIA: ICD-10-CM

## 2022-04-14 RX ORDER — LISINOPRIL 2.5 MG/1
2.5 TABLET ORAL DAILY
Qty: 90 TABLET | Refills: 0 | Status: SHIPPED | OUTPATIENT
Start: 2022-04-14 | End: 2022-06-26 | Stop reason: SDUPTHER

## 2022-04-14 RX ORDER — EMPAGLIFLOZIN, METFORMIN HYDROCHLORIDE 25; 1000 MG/1; MG/1
1 TABLET, EXTENDED RELEASE ORAL DAILY
Qty: 90 TABLET | Refills: 0 | Status: SHIPPED | OUTPATIENT
Start: 2022-04-14 | End: 2022-07-05 | Stop reason: SDUPTHER

## 2022-04-24 DIAGNOSIS — E11.649 UNCONTROLLED TYPE 2 DIABETES MELLITUS WITH HYPOGLYCEMIA, UNSPECIFIED HYPOGLYCEMIA COMA STATUS (HCC): ICD-10-CM

## 2022-06-26 DIAGNOSIS — R80.9 MICROALBUMINURIA: ICD-10-CM

## 2022-06-26 DIAGNOSIS — E78.2 MIXED HYPERLIPIDEMIA: ICD-10-CM

## 2022-06-27 RX ORDER — ATORVASTATIN CALCIUM 20 MG/1
20 TABLET, FILM COATED ORAL DAILY
Qty: 90 TABLET | Refills: 0 | Status: SHIPPED | OUTPATIENT
Start: 2022-06-27 | End: 2022-09-25

## 2022-06-27 RX ORDER — LISINOPRIL 2.5 MG/1
2.5 TABLET ORAL DAILY
Qty: 90 TABLET | Refills: 0 | Status: SHIPPED | OUTPATIENT
Start: 2022-06-27 | End: 2022-09-25

## 2022-07-05 DIAGNOSIS — E11.649 UNCONTROLLED TYPE 2 DIABETES MELLITUS WITH HYPOGLYCEMIA, UNSPECIFIED HYPOGLYCEMIA COMA STATUS (HCC): ICD-10-CM

## 2022-07-05 RX ORDER — EMPAGLIFLOZIN, METFORMIN HYDROCHLORIDE 25; 1000 MG/1; MG/1
1 TABLET, EXTENDED RELEASE ORAL DAILY
Qty: 90 TABLET | Refills: 0 | Status: SHIPPED | OUTPATIENT
Start: 2022-07-05 | End: 2022-10-09 | Stop reason: SDUPTHER

## 2022-08-08 DIAGNOSIS — E11.649 UNCONTROLLED TYPE 2 DIABETES MELLITUS WITH HYPOGLYCEMIA, UNSPECIFIED HYPOGLYCEMIA COMA STATUS (HCC): ICD-10-CM

## 2022-08-29 ENCOUNTER — OFFICE VISIT (OUTPATIENT)
Dept: FAMILY MEDICINE CLINIC | Facility: CLINIC | Age: 47
End: 2022-08-29
Payer: COMMERCIAL

## 2022-08-29 VITALS
DIASTOLIC BLOOD PRESSURE: 72 MMHG | OXYGEN SATURATION: 98 % | HEART RATE: 88 BPM | WEIGHT: 141 LBS | TEMPERATURE: 98 F | HEIGHT: 67 IN | BODY MASS INDEX: 22.13 KG/M2 | SYSTOLIC BLOOD PRESSURE: 122 MMHG

## 2022-08-29 DIAGNOSIS — Z11.59 NEED FOR HEPATITIS C SCREENING TEST: ICD-10-CM

## 2022-08-29 DIAGNOSIS — E55.9 VITAMIN D DEFICIENCY: ICD-10-CM

## 2022-08-29 DIAGNOSIS — E78.2 MIXED HYPERLIPIDEMIA: ICD-10-CM

## 2022-08-29 DIAGNOSIS — Z12.11 SCREENING FOR MALIGNANT NEOPLASM OF COLON: ICD-10-CM

## 2022-08-29 DIAGNOSIS — E53.8 CYANOCOBALAMIN DEFICIENCY: ICD-10-CM

## 2022-08-29 DIAGNOSIS — E11.65 TYPE 2 DIABETES MELLITUS WITH HYPERGLYCEMIA, WITHOUT LONG-TERM CURRENT USE OF INSULIN (HCC): ICD-10-CM

## 2022-08-29 DIAGNOSIS — R80.9 MICROALBUMINURIA: Primary | ICD-10-CM

## 2022-08-29 DIAGNOSIS — H35.81 MACULAR RETINAL EDEMA: ICD-10-CM

## 2022-08-29 PROCEDURE — 99214 OFFICE O/P EST MOD 30 MIN: CPT | Performed by: FAMILY MEDICINE

## 2022-08-29 NOTE — PROGRESS NOTES
Assessment/Plan:    Will get baseline blood work follow-up A1c  Order for Cologuard is colonoscopy screening  diabetic foot exam done today  Will see him back in 6 months of A1c less than 8 is high will see him back in 3 months  Recommendation for COVID booster 2  I have spent 30 minutes with Patient and family today in which greater than 50% of this time was spent in counseling/coordination of care regarding Prognosis, Risks and benefits of tx options, Intructions for management, Patient and family education, Importance of tx compliance, Risk factor reductions and Impressions  Problem List Items Addressed This Visit        Endocrine    Type 2 diabetes mellitus (HCC)    Relevant Orders    Hemoglobin A1C ([facility])    CBC and differential    Comprehensive metabolic panel    UA w Reflex to Microscopic w Reflex to Culture    Microalbumin,Urine       Other    Macular retinal edema    Hyperlipidemia    Relevant Orders    Lipid Panel with Direct LDL reflex    TSH, 3rd generation with Free T4 reflex    Microalbuminuria - Primary      Other Visit Diagnoses     Screening for malignant neoplasm of colon        Relevant Orders    Cologuard    Need for hepatitis C screening test        Relevant Orders    Hepatitis C Antibody ([facility])    Cyanocobalamin deficiency        Relevant Orders    Vitamin B12    Vitamin D deficiency        Relevant Orders    Vitamin D 25 hydroxy            Subjective:      Patient ID: Jb Lucas is a 50 y o  male  49-year-old male follow-up type 2 diabetes  He is on Synjardy Trulicity lisinopril Lipitor  He has diabetic retinopathy follow-up with ophthalmologist monthly  A1c 6 months ago was 7 4  He has never had colonoscopy    Patient to work full-time he is  no children has family in [facility] and [facility]   He is doing very well tolerated medication with no side effect      The following portions of the patient's history were reviewed and updated as appropriate:   Past Medical History:  He has a past medical history of Diabetes mellitus ([place] [address] ), Hyperlipidemia, Immunization deficiency (6/29/2020), Macular retinal edema, Microalbuminuria (6/29/2020), Retinopathy due to secondary diabetes ([place] [address] ), and Type 2 diabetes mellitus ([place] [address] )  ,  _______________________________________________________________________  Medical Problems:  does not have any pertinent problems on file ,  _______________________________________________________________________  Past Surgical History:   has a past surgical history that includes Lipoma resection  ,  _______________________________________________________________________  Family History:  family history includes Cancer in his maternal uncle; Hypertension in his father; Stroke in his father ,  _______________________________________________________________________  Social History:   reports that he has quit smoking  His smoking use included cigarettes  He has a 1 25 pack-year smoking history  He has never used smokeless tobacco  He reports current alcohol use of about 1 0 standard drink of alcohol per week  He reports that he does not use drugs  ,  _______________________________________________________________________  Allergies:  has No Known Allergies     _______________________________________________________________________  Current Outpatient Medications   Medication Sig Dispense Refill    atorvastatin (LIPITOR) 20 mg tablet Take 1 tablet (20 mg total) by mouth daily 90 tablet 0    Dulaglutide 3 MG/0 5ML SOPN Inject 0 5 mL (3 mg total) under the skin once a week 6 mL 0    insulin aspart (NovoLOG FlexPen) 100 UNIT/ML injection pen 8 units three times daily prior to meal 15 mL 1    lisinopril (ZESTRIL) 2 5 mg tablet Take 1 tablet (2 5 mg total) by mouth daily 90 tablet 0    Multiple Vitamin (multivitamin) tablet Take 1 tablet by mouth daily      Omega-3 Fatty Acids (FISH OIL PO) Take by mouth      Synjardy XR  MG TB24 Take 1 tablet by mouth daily 90 tablet 0    Continuous Blood Gluc  (FreeStyle Hillary Helling 2 Addison) LATRICIA 1 reader for CGM=type 2 diabetes with frequent hyperglycemia and hypoglycemia (Patient not taking: Reported on 8/29/2022) 1 each 1    Continuous Blood Gluc Sensor (FreeStyle Roderick 2 Sensor) MISC 2 sensors a month (Patient not taking: Reported on 8/29/2022) 6 each 3     No current facility-administered medications for this visit      _______________________________________________________________________  Review of Systems   Constitutional: Negative for activity change, appetite change, fatigue, fever and unexpected weight change  HENT: Negative for dental problem and trouble swallowing  Eyes: Negative for photophobia and visual disturbance  Respiratory: Negative for cough and chest tightness  Cardiovascular: Negative for chest pain, palpitations and leg swelling  Gastrointestinal: Negative for abdominal pain, constipation and vomiting  Endocrine: Negative for cold intolerance, polydipsia and polyuria  Genitourinary: Negative for difficulty urinating, frequency and urgency  Musculoskeletal: Negative for arthralgias, joint swelling, myalgias and neck pain  Skin: Negative for color change, rash and wound  Allergic/Immunologic: Negative for environmental allergies  Neurological: Negative for dizziness, weakness and numbness  Hematological: Does not bruise/bleed easily  Psychiatric/Behavioral: Negative for decreased concentration, dysphoric mood, self-injury, sleep disturbance and suicidal ideas  Objective:  Vitals:    08/29/22 1758   BP: 122/72   BP Location: Left arm   Patient Position: Sitting   Cuff Size: Standard   Pulse: 88   Temp: 98 °F (36 7 °C)   TempSrc: Temporal   SpO2: 98%   Weight: 64 kg (141 lb)   Height: 5' 7" (1 702 m)     Body mass index is 22 08 kg/m²  Physical Exam  Vitals and nursing note reviewed  Constitutional:       Appearance: Normal appearance   He is well-developed  HENT:      Head: Normocephalic and atraumatic  Right Ear: Tympanic membrane, ear canal and external ear normal       Left Ear: Tympanic membrane, ear canal and external ear normal       Nose: Nose normal       Mouth/Throat:      Mouth: Mucous membranes are moist       Pharynx: Oropharynx is clear  Eyes:      Extraocular Movements: Extraocular movements intact  Pupils: Pupils are equal, round, and reactive to light  Cardiovascular:      Rate and Rhythm: Normal rate and regular rhythm  Pulses: no weak pulses          Dorsalis pedis pulses are 2+ on the right side and 2+ on the left side  Posterior tibial pulses are 1+ on the right side and 1+ on the left side  Heart sounds: Normal heart sounds  Pulmonary:      Effort: Pulmonary effort is normal       Breath sounds: Normal breath sounds  Abdominal:      General: Bowel sounds are normal       Palpations: Abdomen is soft  Musculoskeletal:         General: Normal range of motion  Cervical back: Normal range of motion and neck supple  Feet:      Right foot:      Skin integrity: No ulcer, skin breakdown, erythema, warmth, callus or dry skin  Left foot:      Skin integrity: No ulcer, skin breakdown, erythema, warmth, callus or dry skin  Skin:     General: Skin is warm and dry  Capillary Refill: Capillary refill takes less than 2 seconds  Neurological:      General: No focal deficit present  Mental Status: He is alert and oriented to person, place, and time  Mental status is at baseline  Psychiatric:         Mood and Affect: Mood normal          Behavior: Behavior normal          Thought Content: Thought content normal          Judgment: Judgment normal        Patient's shoes and socks removed  Right Foot/Ankle   Right Foot Inspection  Skin Exam: skin normal and skin intact   No dry skin, no warmth, no callus, no erythema, no maceration, no abnormal color, no pre-ulcer, no ulcer and no callus  Toe Exam: ROM and strength within normal limits  Sensory   Vibration: intact  Proprioception: intact  Monofilament testing: intact    Vascular  Capillary refills: < 3 seconds  The right DP pulse is 2+  The right PT pulse is 1+  Right Toe  - Comprehensive Exam  Ecchymosis: none  Arch: normal  Hammertoes: absent  Claw Toes: absent  Swelling: none   Tenderness: none         Left Foot/Ankle  Left Foot Inspection  Skin Exam: skin normal and skin intact  No dry skin, no warmth, no erythema, no maceration, normal color, no pre-ulcer, no ulcer and no callus  Toe Exam: ROM and strength within normal limits  Sensory   Vibration: intact  Monofilament testing: intact    Vascular  Capillary refills: < 3 seconds  The left DP pulse is 2+  The left PT pulse is 1+       Left Toe  - Comprehensive Exam  Ecchymosis: none  Arch: normal  Hammertoes: absent  Claw toes: absent  Swelling: none   Tenderness: none           Assign Risk Category  No deformity present  No loss of protective sensation  No weak pulses  Risk: 0

## 2022-09-11 ENCOUNTER — APPOINTMENT (OUTPATIENT)
Dept: LAB | Age: 47
End: 2022-09-11

## 2022-09-11 ENCOUNTER — APPOINTMENT (OUTPATIENT)
Dept: LAB | Age: 47
End: 2022-09-11
Payer: COMMERCIAL

## 2022-09-11 DIAGNOSIS — E78.2 MIXED HYPERLIPIDEMIA: ICD-10-CM

## 2022-09-11 DIAGNOSIS — E53.8 CYANOCOBALAMIN DEFICIENCY: ICD-10-CM

## 2022-09-11 DIAGNOSIS — E55.9 VITAMIN D DEFICIENCY: ICD-10-CM

## 2022-09-11 DIAGNOSIS — E11.65 TYPE 2 DIABETES MELLITUS WITH HYPERGLYCEMIA, WITHOUT LONG-TERM CURRENT USE OF INSULIN (HCC): ICD-10-CM

## 2022-09-11 DIAGNOSIS — Z11.59 NEED FOR HEPATITIS C SCREENING TEST: ICD-10-CM

## 2022-09-11 DIAGNOSIS — Z00.8 HEALTH EXAMINATION IN POPULATION SURVEY: ICD-10-CM

## 2022-09-11 DIAGNOSIS — E11.649 UNCONTROLLED TYPE 2 DIABETES MELLITUS WITH HYPOGLYCEMIA, UNSPECIFIED HYPOGLYCEMIA COMA STATUS (HCC): ICD-10-CM

## 2022-09-11 PROCEDURE — 82570 ASSAY OF URINE CREATININE: CPT | Performed by: FAMILY MEDICINE

## 2022-09-11 PROCEDURE — 82607 VITAMIN B-12: CPT

## 2022-09-11 PROCEDURE — 82043 UR ALBUMIN QUANTITATIVE: CPT | Performed by: FAMILY MEDICINE

## 2022-09-11 PROCEDURE — 83721 ASSAY OF BLOOD LIPOPROTEIN: CPT

## 2022-09-11 PROCEDURE — 85025 COMPLETE CBC W/AUTO DIFF WBC: CPT

## 2022-09-11 PROCEDURE — 82306 VITAMIN D 25 HYDROXY: CPT

## 2022-09-11 PROCEDURE — 84443 ASSAY THYROID STIM HORMONE: CPT

## 2022-09-11 PROCEDURE — 80053 COMPREHEN METABOLIC PANEL: CPT

## 2022-09-11 PROCEDURE — 36415 COLL VENOUS BLD VENIPUNCTURE: CPT

## 2022-09-11 PROCEDURE — 83036 HEMOGLOBIN GLYCOSYLATED A1C: CPT

## 2022-09-11 PROCEDURE — 86803 HEPATITIS C AB TEST: CPT

## 2022-09-11 PROCEDURE — 81001 URINALYSIS AUTO W/SCOPE: CPT | Performed by: FAMILY MEDICINE

## 2022-09-11 PROCEDURE — 80061 LIPID PANEL: CPT

## 2022-09-12 LAB
25(OH)D3 SERPL-MCNC: 50.2 NG/ML (ref 30–100)
ALBUMIN SERPL BCP-MCNC: 3.7 G/DL (ref 3.5–5)
ALP SERPL-CCNC: 69 U/L (ref 46–116)
ALT SERPL W P-5'-P-CCNC: 39 U/L (ref 12–78)
ANION GAP SERPL CALCULATED.3IONS-SCNC: 2 MMOL/L (ref 4–13)
AST SERPL W P-5'-P-CCNC: 18 U/L (ref 5–45)
BACTERIA UR QL AUTO: ABNORMAL /HPF
BASOPHILS # BLD AUTO: 0.1 THOUSANDS/ΜL (ref 0–0.1)
BASOPHILS NFR BLD AUTO: 1 % (ref 0–1)
BILIRUB SERPL-MCNC: 0.76 MG/DL (ref 0.2–1)
BILIRUB UR QL STRIP: NEGATIVE
BUN SERPL-MCNC: 21 MG/DL (ref 5–25)
CALCIUM SERPL-MCNC: 8.8 MG/DL (ref 8.3–10.1)
CHLORIDE SERPL-SCNC: 107 MMOL/L (ref 96–108)
CHOLEST SERPL-MCNC: 120 MG/DL
CLARITY UR: CLEAR
CO2 SERPL-SCNC: 27 MMOL/L (ref 21–32)
COLOR UR: ABNORMAL
CREAT SERPL-MCNC: 0.85 MG/DL (ref 0.6–1.3)
CREAT UR-MCNC: 106 MG/DL
EOSINOPHIL # BLD AUTO: 2.96 THOUSAND/ΜL (ref 0–0.61)
EOSINOPHIL NFR BLD AUTO: 28 % (ref 0–6)
ERYTHROCYTE [DISTWIDTH] IN BLOOD BY AUTOMATED COUNT: 12.3 % (ref 11.6–15.1)
EST. AVERAGE GLUCOSE BLD GHB EST-MCNC: 166 MG/DL
GFR SERPL CREATININE-BSD FRML MDRD: 102 ML/MIN/1.73SQ M
GLUCOSE SERPL-MCNC: 105 MG/DL (ref 65–140)
GLUCOSE UR STRIP-MCNC: ABNORMAL MG/DL
HBA1C MFR BLD: 7.4 %
HCT VFR BLD AUTO: 43.4 % (ref 36.5–49.3)
HCV AB SER QL: NORMAL
HDLC SERPL-MCNC: 44 MG/DL
HGB BLD-MCNC: 13.7 G/DL (ref 12–17)
HGB UR QL STRIP.AUTO: NEGATIVE
IMM GRANULOCYTES # BLD AUTO: 0.03 THOUSAND/UL (ref 0–0.2)
IMM GRANULOCYTES NFR BLD AUTO: 0 % (ref 0–2)
KETONES UR STRIP-MCNC: ABNORMAL MG/DL
LDLC SERPL CALC-MCNC: 62 MG/DL (ref 0–100)
LDLC SERPL DIRECT ASSAY-MCNC: 65 MG/DL (ref 0–100)
LEUKOCYTE ESTERASE UR QL STRIP: ABNORMAL
LYMPHOCYTES # BLD AUTO: 2.32 THOUSANDS/ΜL (ref 0.6–4.47)
LYMPHOCYTES NFR BLD AUTO: 22 % (ref 14–44)
MCH RBC QN AUTO: 30.9 PG (ref 26.8–34.3)
MCHC RBC AUTO-ENTMCNC: 31.6 G/DL (ref 31.4–37.4)
MCV RBC AUTO: 98 FL (ref 82–98)
MICROALBUMIN UR-MCNC: 55.8 MG/L (ref 0–20)
MICROALBUMIN/CREAT 24H UR: 53 MG/G CREATININE (ref 0–30)
MONOCYTES # BLD AUTO: 0.56 THOUSAND/ΜL (ref 0.17–1.22)
MONOCYTES NFR BLD AUTO: 5 % (ref 4–12)
MUCOUS THREADS UR QL AUTO: ABNORMAL
NEUTROPHILS # BLD AUTO: 4.47 THOUSANDS/ΜL (ref 1.85–7.62)
NEUTS SEG NFR BLD AUTO: 44 % (ref 43–75)
NITRITE UR QL STRIP: NEGATIVE
NON-SQ EPI CELLS URNS QL MICRO: ABNORMAL /HPF
NONHDLC SERPL-MCNC: 76 MG/DL
NRBC BLD AUTO-RTO: 0 /100 WBCS
PH UR STRIP.AUTO: 5.5 [PH]
PLATELET # BLD AUTO: 206 THOUSANDS/UL (ref 149–390)
PMV BLD AUTO: 12 FL (ref 8.9–12.7)
POTASSIUM SERPL-SCNC: 5 MMOL/L (ref 3.5–5.3)
PROT SERPL-MCNC: 7.4 G/DL (ref 6.4–8.4)
PROT UR STRIP-MCNC: NEGATIVE MG/DL
RBC # BLD AUTO: 4.43 MILLION/UL (ref 3.88–5.62)
RBC #/AREA URNS AUTO: ABNORMAL /HPF
SODIUM SERPL-SCNC: 136 MMOL/L (ref 135–147)
SP GR UR STRIP.AUTO: 1.03 (ref 1–1.03)
TRIGL SERPL-MCNC: 70 MG/DL
TSH SERPL DL<=0.05 MIU/L-ACNC: 0.77 UIU/ML (ref 0.45–4.5)
UROBILINOGEN UR STRIP-ACNC: <2 MG/DL
VIT B12 SERPL-MCNC: 974 PG/ML (ref 100–900)
WBC # BLD AUTO: 10.44 THOUSAND/UL (ref 4.31–10.16)
WBC #/AREA URNS AUTO: ABNORMAL /HPF

## 2022-09-14 RX ORDER — ATORVASTATIN CALCIUM 20 MG/1
20 TABLET, FILM COATED ORAL DAILY
Qty: 90 TABLET | Refills: 0 | Status: SHIPPED | OUTPATIENT
Start: 2022-09-14 | End: 2022-12-13

## 2022-09-19 LAB
CREAT UR-MCNC: 106 MG/DL
MICROALBUMIN UR-MCNC: 55.8 MG/L (ref 0–20)
MICROALBUMIN/CREAT 24H UR: 53 MG/G CREATININE (ref 0–30)

## 2022-10-09 DIAGNOSIS — E11.649 UNCONTROLLED TYPE 2 DIABETES MELLITUS WITH HYPOGLYCEMIA, UNSPECIFIED HYPOGLYCEMIA COMA STATUS (HCC): ICD-10-CM

## 2022-10-09 DIAGNOSIS — R80.9 MICROALBUMINURIA: ICD-10-CM

## 2022-10-10 RX ORDER — EMPAGLIFLOZIN, METFORMIN HYDROCHLORIDE 25; 1000 MG/1; MG/1
1 TABLET, EXTENDED RELEASE ORAL DAILY
Qty: 90 TABLET | Refills: 0 | Status: SHIPPED | OUTPATIENT
Start: 2022-10-10

## 2022-10-10 RX ORDER — LISINOPRIL 2.5 MG/1
2.5 TABLET ORAL DAILY
Qty: 90 TABLET | Refills: 0 | Status: SHIPPED | OUTPATIENT
Start: 2022-10-10 | End: 2023-01-08

## 2022-12-11 DIAGNOSIS — E11.649 UNCONTROLLED TYPE 2 DIABETES MELLITUS WITH HYPOGLYCEMIA, UNSPECIFIED HYPOGLYCEMIA COMA STATUS (HCC): ICD-10-CM

## 2022-12-28 DIAGNOSIS — E11.649 UNCONTROLLED TYPE 2 DIABETES MELLITUS WITH HYPOGLYCEMIA, UNSPECIFIED HYPOGLYCEMIA COMA STATUS (HCC): ICD-10-CM

## 2022-12-28 DIAGNOSIS — R80.9 MICROALBUMINURIA: ICD-10-CM

## 2022-12-28 DIAGNOSIS — E78.2 MIXED HYPERLIPIDEMIA: ICD-10-CM

## 2022-12-29 RX ORDER — EMPAGLIFLOZIN, METFORMIN HYDROCHLORIDE 25; 1000 MG/1; MG/1
1 TABLET, EXTENDED RELEASE ORAL DAILY
Qty: 90 TABLET | Refills: 0 | Status: SHIPPED | OUTPATIENT
Start: 2022-12-29

## 2022-12-29 RX ORDER — LISINOPRIL 2.5 MG/1
2.5 TABLET ORAL DAILY
Qty: 90 TABLET | Refills: 0 | Status: SHIPPED | OUTPATIENT
Start: 2022-12-29 | End: 2023-03-29

## 2022-12-29 RX ORDER — ATORVASTATIN CALCIUM 20 MG/1
20 TABLET, FILM COATED ORAL DAILY
Qty: 90 TABLET | Refills: 0 | Status: SHIPPED | OUTPATIENT
Start: 2022-12-29 | End: 2023-03-29

## 2023-01-09 DIAGNOSIS — E11.649 UNCONTROLLED TYPE 2 DIABETES MELLITUS WITH HYPOGLYCEMIA, UNSPECIFIED HYPOGLYCEMIA COMA STATUS (HCC): ICD-10-CM

## 2023-01-12 DIAGNOSIS — E11.65 TYPE 2 DIABETES MELLITUS WITH HYPERGLYCEMIA, WITHOUT LONG-TERM CURRENT USE OF INSULIN (HCC): Primary | ICD-10-CM

## 2023-01-17 ENCOUNTER — TELEPHONE (OUTPATIENT)
Dept: FAMILY MEDICINE CLINIC | Facility: CLINIC | Age: 48
End: 2023-01-17

## 2023-01-18 ENCOUNTER — TELEPHONE (OUTPATIENT)
Dept: FAMILY MEDICINE CLINIC | Facility: CLINIC | Age: 48
End: 2023-01-18

## 2023-01-18 NOTE — TELEPHONE ENCOUNTER
Received fax for Prior Auth for Ozempic through covermymeds  Key : [id number]  Medication is approved as of 1/18/2023  Called patient to inform medication was approved

## 2023-02-07 DIAGNOSIS — E11.65 TYPE 2 DIABETES MELLITUS WITH HYPERGLYCEMIA, WITHOUT LONG-TERM CURRENT USE OF INSULIN (HCC): ICD-10-CM

## 2023-02-07 DIAGNOSIS — R80.9 MICROALBUMINURIA: ICD-10-CM

## 2023-02-07 DIAGNOSIS — E78.2 MIXED HYPERLIPIDEMIA: ICD-10-CM

## 2023-02-07 DIAGNOSIS — E11.649 UNCONTROLLED TYPE 2 DIABETES MELLITUS WITH HYPOGLYCEMIA, UNSPECIFIED HYPOGLYCEMIA COMA STATUS (HCC): ICD-10-CM

## 2023-02-08 RX ORDER — ATORVASTATIN CALCIUM 20 MG/1
20 TABLET, FILM COATED ORAL DAILY
Qty: 90 TABLET | Refills: 0 | Status: SHIPPED | OUTPATIENT
Start: 2023-02-08 | End: 2023-05-09

## 2023-02-08 RX ORDER — LISINOPRIL 2.5 MG/1
2.5 TABLET ORAL DAILY
Qty: 90 TABLET | Refills: 0 | Status: SHIPPED | OUTPATIENT
Start: 2023-02-08 | End: 2023-05-09

## 2023-02-08 RX ORDER — EMPAGLIFLOZIN, METFORMIN HYDROCHLORIDE 25; 1000 MG/1; MG/1
1 TABLET, EXTENDED RELEASE ORAL DAILY
Qty: 90 TABLET | Refills: 0 | Status: SHIPPED | OUTPATIENT
Start: 2023-02-08

## 2023-03-03 DIAGNOSIS — E78.2 MIXED HYPERLIPIDEMIA: ICD-10-CM

## 2023-03-03 DIAGNOSIS — E11.649 UNCONTROLLED TYPE 2 DIABETES MELLITUS WITH HYPOGLYCEMIA, UNSPECIFIED HYPOGLYCEMIA COMA STATUS (HCC): ICD-10-CM

## 2023-03-03 DIAGNOSIS — R80.9 MICROALBUMINURIA: ICD-10-CM

## 2023-03-03 RX ORDER — LISINOPRIL 2.5 MG/1
2.5 TABLET ORAL DAILY
Qty: 90 TABLET | Refills: 0 | Status: SHIPPED | OUTPATIENT
Start: 2023-03-03 | End: 2023-06-01

## 2023-03-03 RX ORDER — EMPAGLIFLOZIN, METFORMIN HYDROCHLORIDE 25; 1000 MG/1; MG/1
1 TABLET, EXTENDED RELEASE ORAL DAILY
Qty: 90 TABLET | Refills: 0 | Status: SHIPPED | OUTPATIENT
Start: 2023-03-03

## 2023-03-03 RX ORDER — ATORVASTATIN CALCIUM 20 MG/1
20 TABLET, FILM COATED ORAL DAILY
Qty: 90 TABLET | Refills: 0 | Status: SHIPPED | OUTPATIENT
Start: 2023-03-03 | End: 2023-06-01

## 2023-03-13 ENCOUNTER — OFFICE VISIT (OUTPATIENT)
Dept: FAMILY MEDICINE CLINIC | Facility: CLINIC | Age: 48
End: 2023-03-13

## 2023-03-13 VITALS
TEMPERATURE: 99 F | SYSTOLIC BLOOD PRESSURE: 142 MMHG | HEART RATE: 98 BPM | WEIGHT: 140.2 LBS | OXYGEN SATURATION: 98 % | BODY MASS INDEX: 22 KG/M2 | HEIGHT: 67 IN | DIASTOLIC BLOOD PRESSURE: 82 MMHG

## 2023-03-13 DIAGNOSIS — R80.9 MICROALBUMINURIA: ICD-10-CM

## 2023-03-13 DIAGNOSIS — E13.319 RETINOPATHY DUE TO SECONDARY DIABETES (HCC): ICD-10-CM

## 2023-03-13 DIAGNOSIS — E78.2 MIXED HYPERLIPIDEMIA: ICD-10-CM

## 2023-03-13 DIAGNOSIS — E11.00 TYPE 2 DIABETES MELLITUS WITH HYPEROSMOLARITY WITHOUT COMA, WITHOUT LONG-TERM CURRENT USE OF INSULIN (HCC): Primary | ICD-10-CM

## 2023-03-13 DIAGNOSIS — E11.649 UNCONTROLLED TYPE 2 DIABETES MELLITUS WITH HYPOGLYCEMIA, UNSPECIFIED HYPOGLYCEMIA COMA STATUS (HCC): ICD-10-CM

## 2023-03-13 RX ORDER — EMPAGLIFLOZIN, METFORMIN HYDROCHLORIDE 25; 1000 MG/1; MG/1
1 TABLET, EXTENDED RELEASE ORAL DAILY
Qty: 90 TABLET | Refills: 1 | Status: SHIPPED | OUTPATIENT
Start: 2023-03-13

## 2023-03-13 NOTE — PROGRESS NOTES
Assessment/Plan: Laboratory studies and records reviewed  Patient will continue with Trulicity 3 mg q  weekly refills given at this time  Patient will continue with other diabetic medications listed and will have laboratory studies before further adjustments will be made  Patient will be seeing endocrinology and dietitian in the near future also  Patient will check blood sugars daily as directed  Patient will follow-up with ophthalmology appropriately regarding ongoing retinopathy with need for injections  Monofilament test done at this time which was normal   Patient will continue with current regimen for hypertension as well as hyperlipidemia  Patient understands blood pressure not controlled at the present time but will reevaluate at follow-up visit  Refills in this regard will be given when needed  Patient have laboratory studies prior to next visit in 3 months       Diagnoses and all orders for this visit:    Type 2 diabetes mellitus with hyperosmolarity without coma, without long-term current use of insulin (HCC)  -     dulaglutide (Trulicity) 3 QN/3 9XL injection; Inject 0 5 mL (3 mg total) under the skin every 7 days  -     CBC and differential; Future  -     Comprehensive metabolic panel; Future  -     Hemoglobin A1C; Future  -     Lipid panel; Future  -     Microalbumin / creatinine urine ratio  -     TSH, 3rd generation with Free T4 reflex; Future    Retinopathy due to secondary diabetes (HCC)  -     dulaglutide (Trulicity) 3 CF/0 7DL injection; Inject 0 5 mL (3 mg total) under the skin every 7 days  -     CBC and differential; Future  -     Comprehensive metabolic panel; Future  -     Hemoglobin A1C; Future  -     Lipid panel; Future  -     Microalbumin / creatinine urine ratio  -     TSH, 3rd generation with Free T4 reflex; Future    Mixed hyperlipidemia  -     dulaglutide (Trulicity) 3 KD/7 8KS injection;  Inject 0 5 mL (3 mg total) under the skin every 7 days  -     CBC and differential; Future  -     Comprehensive metabolic panel; Future  -     Hemoglobin A1C; Future  -     Lipid panel; Future  -     Microalbumin / creatinine urine ratio  -     TSH, 3rd generation with Free T4 reflex; Future    Microalbuminuria  -     dulaglutide (Trulicity) 3 TE/2 5CJ injection; Inject 0 5 mL (3 mg total) under the skin every 7 days  -     CBC and differential; Future  -     Comprehensive metabolic panel; Future  -     Hemoglobin A1C; Future  -     Lipid panel; Future  -     Microalbumin / creatinine urine ratio  -     TSH, 3rd generation with Free T4 reflex; Future    Uncontrolled type 2 diabetes mellitus with hypoglycemia, unspecified hypoglycemia coma status (HCC)  -     dulaglutide (Trulicity) 3 OR/7 0OW injection; Inject 0 5 mL (3 mg total) under the skin every 7 days  -     CBC and differential; Future  -     Comprehensive metabolic panel; Future  -     Hemoglobin A1C; Future  -     Lipid panel; Future  -     Microalbumin / creatinine urine ratio  -     TSH, 3rd generation with Free T4 reflex; Future  -     Synjardy XR  MG TB24; Take 1 tablet by mouth daily            Subjective:        Patient ID: Heather Pandey is a 50 y o  male  Patient is here as a new patient to establish care  Patient's past medical history surgical history allergies medication family history and social history are reviewed at present time  Patient with longstanding history of diabetes  Patient states blood sugars fasting have been in the low 100s up to the 120s  Patient was on Ozempic but now on Trulicity 3 mg  Patient other medications reviewed at this time  No new chest pain shortness of breath abdominal pain or problems urinating or defecating  No foot related issues at this time  Patient does see ophthalmologist routinely due to retinopathy bilaterally  Patient is receiving injections and has had laser surgery in the past   No other complaints other than visual acuity  Last blood work September 2022    Patient will be seeing endocrinology as well as dietitian in the near future  Appointment has already been made  The following portions of the patient's history were reviewed and updated as appropriate: allergies, current medications, past family history, past medical history, past social history, past surgical history and problem list       Review of Systems   Constitutional: Negative  HENT: Negative  Eyes: Positive for visual disturbance  Respiratory: Negative  Cardiovascular: Negative  Gastrointestinal: Negative  Endocrine: Negative  Genitourinary: Negative  Musculoskeletal: Negative  Skin: Negative  Allergic/Immunologic: Negative  Neurological: Negative  Hematological: Negative  Psychiatric/Behavioral: Negative  Objective:        Depression Screening and Follow-up Plan: Patient was screened for depression during today's encounter  They screened negative with a PHQ-2 score of 0             /82 (BP Location: Right arm, Patient Position: Sitting, Cuff Size: Standard)   Pulse 98   Temp 99 °F (37 2 °C) (Temporal)   Ht 5' 7" (1 702 m)   Wt 63 6 kg (140 lb 3 2 oz)   SpO2 98%   BMI 21 96 kg/m²          Physical Exam  Vitals and nursing note reviewed  Constitutional:       General: He is not in acute distress  Appearance: Normal appearance  He is not ill-appearing, toxic-appearing or diaphoretic  HENT:      Head: Normocephalic and atraumatic  Right Ear: Tympanic membrane, ear canal and external ear normal  There is no impacted cerumen  Left Ear: Tympanic membrane, ear canal and external ear normal  There is no impacted cerumen  Nose: Nose normal  No congestion or rhinorrhea  Mouth/Throat:      Mouth: Mucous membranes are moist       Pharynx: No oropharyngeal exudate or posterior oropharyngeal erythema  Eyes:      General: No scleral icterus  Right eye: No discharge  Left eye: No discharge        Extraocular Movements: Extraocular movements intact  Conjunctiva/sclera: Conjunctivae normal       Pupils: Pupils are equal, round, and reactive to light  Neck:      Vascular: No carotid bruit  Cardiovascular:      Rate and Rhythm: Normal rate and regular rhythm  Pulses: Normal pulses  Heart sounds: Normal heart sounds  No murmur heard  No friction rub  No gallop  Pulmonary:      Effort: Pulmonary effort is normal  No respiratory distress  Breath sounds: Normal breath sounds  No stridor  No wheezing, rhonchi or rales  Chest:      Chest wall: No tenderness  Musculoskeletal:         General: No swelling, tenderness, deformity or signs of injury  Normal range of motion  Cervical back: Normal range of motion and neck supple  No rigidity  No muscular tenderness  Right lower leg: No edema  Left lower leg: No edema  Lymphadenopathy:      Cervical: No cervical adenopathy  Skin:     General: Skin is warm and dry  Capillary Refill: Capillary refill takes less than 2 seconds  Coloration: Skin is not jaundiced  Findings: No bruising, erythema, lesion or rash  Neurological:      Mental Status: He is alert and oriented to person, place, and time  Mental status is at baseline  Cranial Nerves: No cranial nerve deficit  Sensory: No sensory deficit  Motor: No weakness  Coordination: Coordination normal       Gait: Gait normal    Psychiatric:         Mood and Affect: Mood normal          Behavior: Behavior normal          Thought Content:  Thought content normal          Judgment: Judgment normal

## 2023-03-15 DIAGNOSIS — R80.9 MICROALBUMINURIA: ICD-10-CM

## 2023-03-15 DIAGNOSIS — E78.2 MIXED HYPERLIPIDEMIA: ICD-10-CM

## 2023-03-15 DIAGNOSIS — E11.649 UNCONTROLLED TYPE 2 DIABETES MELLITUS WITH HYPOGLYCEMIA, UNSPECIFIED HYPOGLYCEMIA COMA STATUS (HCC): ICD-10-CM

## 2023-03-15 DIAGNOSIS — E13.319 RETINOPATHY DUE TO SECONDARY DIABETES (HCC): ICD-10-CM

## 2023-03-15 DIAGNOSIS — E11.00 TYPE 2 DIABETES MELLITUS WITH HYPEROSMOLARITY WITHOUT COMA, WITHOUT LONG-TERM CURRENT USE OF INSULIN (HCC): ICD-10-CM

## 2023-03-26 ENCOUNTER — LAB (OUTPATIENT)
Dept: LAB | Age: 48
End: 2023-03-26

## 2023-03-26 DIAGNOSIS — E11.649 UNCONTROLLED TYPE 2 DIABETES MELLITUS WITH HYPOGLYCEMIA, UNSPECIFIED HYPOGLYCEMIA COMA STATUS (HCC): ICD-10-CM

## 2023-03-26 DIAGNOSIS — E13.319 RETINOPATHY DUE TO SECONDARY DIABETES (HCC): ICD-10-CM

## 2023-03-26 DIAGNOSIS — R80.9 MICROALBUMINURIA: ICD-10-CM

## 2023-03-26 DIAGNOSIS — E78.2 MIXED HYPERLIPIDEMIA: ICD-10-CM

## 2023-03-26 DIAGNOSIS — E11.00 TYPE 2 DIABETES MELLITUS WITH HYPEROSMOLARITY WITHOUT COMA, WITHOUT LONG-TERM CURRENT USE OF INSULIN (HCC): ICD-10-CM

## 2023-03-26 LAB
ALBUMIN SERPL BCP-MCNC: 4.1 G/DL (ref 3.5–5)
ALP SERPL-CCNC: 64 U/L (ref 46–116)
ALT SERPL W P-5'-P-CCNC: 41 U/L (ref 12–78)
ANION GAP SERPL CALCULATED.3IONS-SCNC: 1 MMOL/L (ref 4–13)
AST SERPL W P-5'-P-CCNC: 23 U/L (ref 5–45)
BASOPHILS # BLD AUTO: 0.1 THOUSANDS/ÂΜL (ref 0–0.1)
BASOPHILS NFR BLD AUTO: 1 % (ref 0–1)
BILIRUB SERPL-MCNC: 0.83 MG/DL (ref 0.2–1)
BUN SERPL-MCNC: 17 MG/DL (ref 5–25)
CALCIUM SERPL-MCNC: 9.9 MG/DL (ref 8.3–10.1)
CHLORIDE SERPL-SCNC: 107 MMOL/L (ref 96–108)
CHOLEST SERPL-MCNC: 131 MG/DL
CO2 SERPL-SCNC: 27 MMOL/L (ref 21–32)
CREAT SERPL-MCNC: 0.91 MG/DL (ref 0.6–1.3)
CREAT UR-MCNC: 69.2 MG/DL
EOSINOPHIL # BLD AUTO: 2.02 THOUSAND/ÂΜL (ref 0–0.61)
EOSINOPHIL NFR BLD AUTO: 24 % (ref 0–6)
ERYTHROCYTE [DISTWIDTH] IN BLOOD BY AUTOMATED COUNT: 12.3 % (ref 11.6–15.1)
EST. AVERAGE GLUCOSE BLD GHB EST-MCNC: 174 MG/DL
GFR SERPL CREATININE-BSD FRML MDRD: 98 ML/MIN/1.73SQ M
GLUCOSE P FAST SERPL-MCNC: 125 MG/DL (ref 65–99)
HBA1C MFR BLD: 7.7 %
HCT VFR BLD AUTO: 45 % (ref 36.5–49.3)
HDLC SERPL-MCNC: 48 MG/DL
HGB BLD-MCNC: 14.3 G/DL (ref 12–17)
IMM GRANULOCYTES # BLD AUTO: 0.02 THOUSAND/UL (ref 0–0.2)
IMM GRANULOCYTES NFR BLD AUTO: 0 % (ref 0–2)
LDLC SERPL CALC-MCNC: 66 MG/DL (ref 0–100)
LYMPHOCYTES # BLD AUTO: 2.05 THOUSANDS/ÂΜL (ref 0.6–4.47)
LYMPHOCYTES NFR BLD AUTO: 24 % (ref 14–44)
MCH RBC QN AUTO: 30.6 PG (ref 26.8–34.3)
MCHC RBC AUTO-ENTMCNC: 31.8 G/DL (ref 31.4–37.4)
MCV RBC AUTO: 96 FL (ref 82–98)
MICROALBUMIN UR-MCNC: 54.4 MG/L (ref 0–20)
MICROALBUMIN/CREAT 24H UR: 79 MG/G CREATININE (ref 0–30)
MONOCYTES # BLD AUTO: 0.55 THOUSAND/ÂΜL (ref 0.17–1.22)
MONOCYTES NFR BLD AUTO: 7 % (ref 4–12)
NEUTROPHILS # BLD AUTO: 3.75 THOUSANDS/ÂΜL (ref 1.85–7.62)
NEUTS SEG NFR BLD AUTO: 44 % (ref 43–75)
NONHDLC SERPL-MCNC: 83 MG/DL
NRBC BLD AUTO-RTO: 0 /100 WBCS
PLATELET # BLD AUTO: 189 THOUSANDS/UL (ref 149–390)
PMV BLD AUTO: 11 FL (ref 8.9–12.7)
POTASSIUM SERPL-SCNC: 5 MMOL/L (ref 3.5–5.3)
PROT SERPL-MCNC: 7.8 G/DL (ref 6.4–8.4)
RBC # BLD AUTO: 4.67 MILLION/UL (ref 3.88–5.62)
SODIUM SERPL-SCNC: 135 MMOL/L (ref 135–147)
TRIGL SERPL-MCNC: 84 MG/DL
TSH SERPL DL<=0.05 MIU/L-ACNC: 1.04 UIU/ML (ref 0.45–4.5)
WBC # BLD AUTO: 8.49 THOUSAND/UL (ref 4.31–10.16)

## 2023-05-01 DIAGNOSIS — E11.00 TYPE 2 DIABETES MELLITUS WITH HYPEROSMOLARITY WITHOUT COMA, WITHOUT LONG-TERM CURRENT USE OF INSULIN (HCC): Primary | ICD-10-CM

## 2023-05-01 RX ORDER — METFORMIN HYDROCHLORIDE 500 MG/1
1000 TABLET, EXTENDED RELEASE ORAL
Qty: 100 TABLET | Refills: 1 | Status: SHIPPED | OUTPATIENT
Start: 2023-05-01

## 2023-06-12 DIAGNOSIS — E78.2 MIXED HYPERLIPIDEMIA: ICD-10-CM

## 2023-06-12 DIAGNOSIS — E11.00 TYPE 2 DIABETES MELLITUS WITH HYPEROSMOLARITY WITHOUT COMA, WITHOUT LONG-TERM CURRENT USE OF INSULIN (HCC): ICD-10-CM

## 2023-06-12 DIAGNOSIS — R80.9 MICROALBUMINURIA: ICD-10-CM

## 2023-06-12 DIAGNOSIS — E13.319 RETINOPATHY DUE TO SECONDARY DIABETES (HCC): ICD-10-CM

## 2023-06-12 DIAGNOSIS — E11.649 UNCONTROLLED TYPE 2 DIABETES MELLITUS WITH HYPOGLYCEMIA, UNSPECIFIED HYPOGLYCEMIA COMA STATUS (HCC): ICD-10-CM

## 2023-06-13 RX ORDER — METFORMIN HYDROCHLORIDE 500 MG/1
1000 TABLET, EXTENDED RELEASE ORAL
Qty: 100 TABLET | Refills: 0 | Status: SHIPPED | OUTPATIENT
Start: 2023-06-13

## 2023-06-25 ENCOUNTER — APPOINTMENT (OUTPATIENT)
Dept: LAB | Age: 48
End: 2023-06-25
Payer: COMMERCIAL

## 2023-06-25 ENCOUNTER — APPOINTMENT (OUTPATIENT)
Dept: LAB | Age: 48
End: 2023-06-25

## 2023-06-25 DIAGNOSIS — E78.2 MIXED HYPERLIPIDEMIA: ICD-10-CM

## 2023-06-25 DIAGNOSIS — Z00.8 HEALTH EXAMINATION IN POPULATION SURVEY: ICD-10-CM

## 2023-06-25 DIAGNOSIS — R80.9 MICROALBUMINURIA: ICD-10-CM

## 2023-06-25 DIAGNOSIS — E11.00 TYPE 2 DIABETES MELLITUS WITH HYPEROSMOLARITY WITHOUT COMA, WITHOUT LONG-TERM CURRENT USE OF INSULIN (HCC): ICD-10-CM

## 2023-06-25 LAB
CHOLEST SERPL-MCNC: 112 MG/DL
CREAT UR-MCNC: 109 MG/DL
EST. AVERAGE GLUCOSE BLD GHB EST-MCNC: 157 MG/DL
GLUCOSE P FAST SERPL-MCNC: 138 MG/DL (ref 65–99)
HBA1C MFR BLD: 7.1 %
HDLC SERPL-MCNC: 48 MG/DL
LDLC SERPL CALC-MCNC: 46 MG/DL (ref 0–100)
MICROALBUMIN UR-MCNC: 73 MG/L (ref 0–20)
MICROALBUMIN/CREAT 24H UR: 67 MG/G CREATININE (ref 0–30)
TRIGL SERPL-MCNC: 90 MG/DL

## 2023-06-25 PROCEDURE — 36415 COLL VENOUS BLD VENIPUNCTURE: CPT

## 2023-06-25 PROCEDURE — 83519 RIA NONANTIBODY: CPT

## 2023-06-25 PROCEDURE — 82570 ASSAY OF URINE CREATININE: CPT

## 2023-06-25 PROCEDURE — 82947 ASSAY GLUCOSE BLOOD QUANT: CPT

## 2023-06-25 PROCEDURE — 84681 ASSAY OF C-PEPTIDE: CPT

## 2023-06-25 PROCEDURE — 82043 UR ALBUMIN QUANTITATIVE: CPT

## 2023-06-25 PROCEDURE — 83036 HEMOGLOBIN GLYCOSYLATED A1C: CPT

## 2023-06-25 PROCEDURE — 80061 LIPID PANEL: CPT

## 2023-06-26 LAB
C PEPTIDE SERPL-MCNC: 1.4 NG/ML (ref 1.1–4.4)
LEFT EYE DIABETIC RETINOPATHY: POSITIVE
RIGHT EYE DIABETIC RETINOPATHY: POSITIVE

## 2023-06-27 DIAGNOSIS — E78.2 MIXED HYPERLIPIDEMIA: ICD-10-CM

## 2023-06-27 DIAGNOSIS — R80.9 MICROALBUMINURIA: ICD-10-CM

## 2023-06-27 RX ORDER — ATORVASTATIN CALCIUM 20 MG/1
20 TABLET, FILM COATED ORAL DAILY
Qty: 90 TABLET | Refills: 1 | Status: SHIPPED | OUTPATIENT
Start: 2023-06-27 | End: 2023-09-25

## 2023-06-27 RX ORDER — LISINOPRIL 2.5 MG/1
2.5 TABLET ORAL DAILY
Qty: 90 TABLET | Refills: 1 | Status: SHIPPED | OUTPATIENT
Start: 2023-06-27 | End: 2023-09-25

## 2023-06-27 NOTE — TELEPHONE ENCOUNTER
Pt's wife called requesting refills as the patient is going to [facility] this weekend and he is almost out

## 2023-06-28 LAB — GAD65 AB SER-ACNC: <5 U/ML (ref 0–5)

## 2023-08-14 DIAGNOSIS — E11.00 TYPE 2 DIABETES MELLITUS WITH HYPEROSMOLARITY WITHOUT COMA, WITHOUT LONG-TERM CURRENT USE OF INSULIN (HCC): ICD-10-CM

## 2023-08-14 RX ORDER — METFORMIN HYDROCHLORIDE 500 MG/1
1000 TABLET, EXTENDED RELEASE ORAL
Qty: 100 TABLET | Refills: 0 | Status: SHIPPED | OUTPATIENT
Start: 2023-08-14

## 2023-09-11 ENCOUNTER — OFFICE VISIT (OUTPATIENT)
Dept: FAMILY MEDICINE CLINIC | Facility: CLINIC | Age: 48
End: 2023-09-11
Payer: COMMERCIAL

## 2023-09-11 VITALS
BODY MASS INDEX: 21.82 KG/M2 | HEART RATE: 84 BPM | WEIGHT: 139 LBS | HEIGHT: 67 IN | SYSTOLIC BLOOD PRESSURE: 124 MMHG | OXYGEN SATURATION: 99 % | DIASTOLIC BLOOD PRESSURE: 80 MMHG | TEMPERATURE: 97.5 F

## 2023-09-11 DIAGNOSIS — E13.319 RETINOPATHY DUE TO SECONDARY DIABETES (HCC): ICD-10-CM

## 2023-09-11 DIAGNOSIS — E11.00 TYPE 2 DIABETES MELLITUS WITH HYPEROSMOLARITY WITHOUT COMA, UNSPECIFIED WHETHER LONG TERM INSULIN USE (HCC): Primary | ICD-10-CM

## 2023-09-11 DIAGNOSIS — R80.9 MICROALBUMINURIA: ICD-10-CM

## 2023-09-11 DIAGNOSIS — E78.2 MIXED HYPERLIPIDEMIA: ICD-10-CM

## 2023-09-11 PROCEDURE — 99214 OFFICE O/P EST MOD 30 MIN: CPT | Performed by: FAMILY MEDICINE

## 2023-09-11 RX ORDER — EMPAGLIFLOZIN, METFORMIN HYDROCHLORIDE 25; 1000 MG/1; MG/1
TABLET, EXTENDED RELEASE ORAL
COMMUNITY
Start: 2023-06-16 | End: 2023-09-15 | Stop reason: SDUPTHER

## 2023-09-11 NOTE — PROGRESS NOTES
Assessment/Plan: Urine microalbumin 67 LDL 46 A1c 7.1. TSH 1.0 CMP reviewed. CBC reviewed. Patient will follow with ophthalmology appropriately. Chronic conditions improving overall. Patient will continue with current regimen at this time. Patient will follow-up with endocrinology appropriately. Refills will be given when needed. Follow-up yearly       Diagnoses and all orders for this visit:    Type 2 diabetes mellitus with hyperosmolarity without coma, unspecified whether long term insulin use ([address])    Microalbuminuria    Retinopathy due to secondary diabetes ([address])    Mixed hyperlipidemia    Other orders  -     Synjardy XR  MG TB24            Subjective:        Patient ID: Tim Ortiz is a 46 y.o. male. Patient here to follow-up on diabetes, microalbuminuria with history of retinopathy and hyperlipidemia. Patient feeling well without complaints. Patient did see endocrinology. Patient had metformin added to regimen. The following portions of the patient's history were reviewed and updated as appropriate: allergies, current medications, past family history, past medical history, past social history, past surgical history and problem list.      Review of Systems   Constitutional: Negative. HENT: Negative. Eyes: Negative. Respiratory: Negative. Cardiovascular: Negative. Gastrointestinal: Negative. Endocrine: Negative. Genitourinary: Negative. Musculoskeletal: Negative. Skin: Negative. Allergic/Immunologic: Negative. Neurological: Negative. Hematological: Negative. Psychiatric/Behavioral: Negative. Objective:        Depression Screening and Follow-up Plan: Clincally patient does not have depression. No treatment is required.              /80 (BP Location: Right arm, Patient Position: Sitting, Cuff Size: Standard)   Pulse 84   Temp 97.5 °F (36.4 °C) (Temporal)   Ht 5' 7" (1.702 m)   Wt 63 kg (139 lb)   SpO2 99%   BMI 21.77 kg/m² Physical Exam  Vitals and nursing note reviewed. Constitutional:       General: He is not in acute distress. Appearance: Normal appearance. He is not ill-appearing, toxic-appearing or diaphoretic. HENT:      Head: Normocephalic and atraumatic. Right Ear: Tympanic membrane, ear canal and external ear normal. There is no impacted cerumen. Left Ear: Tympanic membrane, ear canal and external ear normal. There is no impacted cerumen. Nose: Nose normal. No congestion or rhinorrhea. Mouth/Throat:      Mouth: Mucous membranes are moist.      Pharynx: No oropharyngeal exudate or posterior oropharyngeal erythema. Eyes:      General: No scleral icterus. Right eye: No discharge. Left eye: No discharge. Neck:      Vascular: No carotid bruit. Cardiovascular:      Rate and Rhythm: Normal rate and regular rhythm. Pulses: Normal pulses. Heart sounds: Normal heart sounds. No murmur heard. No friction rub. No gallop. Pulmonary:      Effort: Pulmonary effort is normal. No respiratory distress. Breath sounds: Normal breath sounds. No stridor. No wheezing, rhonchi or rales. Chest:      Chest wall: No tenderness. Musculoskeletal:         General: No swelling, tenderness, deformity or signs of injury. Normal range of motion. Cervical back: Normal range of motion and neck supple. No rigidity. No muscular tenderness. Right lower leg: No edema. Left lower leg: No edema. Lymphadenopathy:      Cervical: No cervical adenopathy. Skin:     General: Skin is warm and dry. Capillary Refill: Capillary refill takes less than 2 seconds. Coloration: Skin is not jaundiced. Findings: No bruising, erythema, lesion or rash. Neurological:      Mental Status: He is alert and oriented to person, place, and time. Mental status is at baseline. Cranial Nerves: No cranial nerve deficit. Sensory: No sensory deficit.       Motor: No weakness. Coordination: Coordination normal.      Gait: Gait normal.   Psychiatric:         Mood and Affect: Mood normal.         Behavior: Behavior normal.         Thought Content:  Thought content normal.         Judgment: Judgment normal.

## 2023-09-15 DIAGNOSIS — E11.00 TYPE 2 DIABETES MELLITUS WITH HYPEROSMOLARITY WITHOUT COMA, WITHOUT LONG-TERM CURRENT USE OF INSULIN (HCC): Primary | ICD-10-CM

## 2023-09-15 RX ORDER — EMPAGLIFLOZIN, METFORMIN HYDROCHLORIDE 25; 1000 MG/1; MG/1
1 TABLET, EXTENDED RELEASE ORAL 2 TIMES DAILY
Qty: 180 TABLET | Refills: 0 | Status: SHIPPED | OUTPATIENT
Start: 2023-09-15

## 2023-09-20 ENCOUNTER — TELEPHONE (OUTPATIENT)
Dept: FAMILY MEDICINE CLINIC | Facility: CLINIC | Age: 48
End: 2023-09-20

## 2023-09-20 NOTE — TELEPHONE ENCOUNTER
We received a notification that an Alba Ravi is needed for synjardy. I attempted to initiate the auth through [facility], but received the notification the med as already denied 4/2023. * The denial notification does indeed indicate the med is denied for not meeting criteria but mentions about having a max daily dose of 25mg (empagliflozin) *    So I reached out to [facility] @ [phone], spoke with Donald Florence. He was not able to provide me specific information/clarification so we opened an appeal. Advised clinical information is needed. I gathered the clinical information and faxed it to [phone] with Case #: [id number]. Awaiting response.

## 2023-09-28 DIAGNOSIS — E11.00 TYPE 2 DIABETES MELLITUS WITH HYPEROSMOLARITY WITHOUT COMA, WITHOUT LONG-TERM CURRENT USE OF INSULIN (HCC): Primary | ICD-10-CM

## 2023-10-04 ENCOUNTER — OFFICE VISIT (OUTPATIENT)
Dept: ENDOCRINOLOGY | Facility: HOSPITAL | Age: 48
End: 2023-10-04
Payer: COMMERCIAL

## 2023-10-04 VITALS
DIASTOLIC BLOOD PRESSURE: 78 MMHG | SYSTOLIC BLOOD PRESSURE: 118 MMHG | HEIGHT: 67 IN | HEART RATE: 98 BPM | WEIGHT: 137 LBS | OXYGEN SATURATION: 99 % | BODY MASS INDEX: 21.5 KG/M2

## 2023-10-04 DIAGNOSIS — R80.9 MICROALBUMINURIA: ICD-10-CM

## 2023-10-04 DIAGNOSIS — E11.00 TYPE 2 DIABETES MELLITUS WITH HYPEROSMOLARITY WITHOUT COMA, WITHOUT LONG-TERM CURRENT USE OF INSULIN (HCC): ICD-10-CM

## 2023-10-04 DIAGNOSIS — E11.00 TYPE 2 DIABETES MELLITUS WITH HYPEROSMOLARITY WITHOUT COMA, WITHOUT LONG-TERM CURRENT USE OF INSULIN (HCC): Primary | ICD-10-CM

## 2023-10-04 DIAGNOSIS — E78.2 MIXED HYPERLIPIDEMIA: ICD-10-CM

## 2023-10-04 PROCEDURE — 99214 OFFICE O/P EST MOD 30 MIN: CPT | Performed by: STUDENT IN AN ORGANIZED HEALTH CARE EDUCATION/TRAINING PROGRAM

## 2023-10-04 RX ORDER — LISINOPRIL 2.5 MG/1
2.5 TABLET ORAL DAILY
Qty: 90 TABLET | Refills: 1 | Status: SHIPPED | OUTPATIENT
Start: 2023-10-04 | End: 2024-04-01

## 2023-10-04 RX ORDER — ATORVASTATIN CALCIUM 20 MG/1
20 TABLET, FILM COATED ORAL DAILY
Qty: 90 TABLET | Refills: 0 | Status: SHIPPED | OUTPATIENT
Start: 2023-10-04

## 2023-10-04 RX ORDER — EMPAGLIFLOZIN, METFORMIN HYDROCHLORIDE 25; 1000 MG/1; MG/1
1 TABLET, EXTENDED RELEASE ORAL DAILY
Qty: 90 TABLET | Refills: 1 | Status: SHIPPED | OUTPATIENT
Start: 2023-10-04

## 2023-10-04 RX ORDER — DULAGLUTIDE 1.5 MG/.5ML
1.5 INJECTION, SOLUTION SUBCUTANEOUS
Qty: 2 ML | Refills: 2 | Status: SHIPPED | OUTPATIENT
Start: 2023-10-04

## 2023-10-04 RX ORDER — EMPAGLIFLOZIN, METFORMIN HYDROCHLORIDE 25; 1000 MG/1; MG/1
1 TABLET, EXTENDED RELEASE ORAL DAILY
Qty: 90 TABLET | Refills: 1 | Status: SHIPPED | OUTPATIENT
Start: 2023-10-04 | End: 2023-10-04 | Stop reason: SDUPTHER

## 2023-10-04 RX ORDER — METFORMIN HYDROCHLORIDE 500 MG/1
1000 TABLET, EXTENDED RELEASE ORAL
Qty: 100 TABLET | Refills: 0 | Status: SHIPPED | OUTPATIENT
Start: 2023-10-04

## 2023-10-04 NOTE — PROGRESS NOTES
Follow up Progress note    Chief Complaint   Patient presents with    Diabetes Type 2        History of Present Illness:   Ronda Velez is a 46 y.o. male with a history of possible type 2 diabetes which went undiagnosed for over 10 years leading to development of diabetic complications of b/l PDR needing Lazer Tx and also on VEGF injections every 6 weeks, with microalbuminuria w/o CKD who presents today for diabetic care, last visit 04/23      Last visit given ? PREET, screened which showed neg RENITA and normal C peptide 1.4. Patient is [place] speaking and wife present today to help translate. Patient is concerned about his weight loss as continuous to lose weight with his diabetes medications despite eating. Patient was first diagnosed with T2DM in- was undiagnosed for 10 years. Control since diagnosis:- previously poor control, HbA1C since 2021 shows 8.4%  Medications tried thus far:- Ozempic (reports BG were poor when on this)- tried to use it when had trulicity shortage,   Current regime:- Synjardy 25-1000mg tablet daily, trulicity 3mg weekly, Novolog 8u (only when on vacation and eating badly, Metformin 1000mg daily. BG control-  Checks fasting only mostly in 120-140 range, when checked post prandial occasionally high in 200 range  Hypoglycemic episodes: None  Hyperglycemia symptoms:- Drinks water normally- but no polyuria or polydipsia ,no chest pain, dyspnea or TIAs,no numbness, tingling or pain in extremities  Diet-  reports eats 3 meals a day  Activity- works as a body shop , mainly stays active at work  [facility]- stable since last visit     Opthamology: Last exam 06/23 abnormal PDR- but reports saw follows Dr Lizz Villalta recently- [address] [facility] care in [facility].    Last foot exam 04/23  Hx of hyperlipidemia- yes on lipitor  Hx of hypertension- yes on 2.5mg lisinopril    Last Lipid:- 06/23, LDL <100, on lipitor 20mg daily   Last urine microalbumin:- 06/23- abnormal, patient on [place] 25-1000mg 1 tablet and Lisinopril 2.5mg daily  Last HbA1C 06/23 7.1%    Social Hx:- Does not smoke, occasional alcohol use, No other drugs  Family HX:- No family Hx of DM. Patient Active Problem List   Diagnosis    Type 2 diabetes mellitus ([address])    Retinopathy due to secondary diabetes ([address])    Macular retinal edema    Hyperlipidemia    Microalbuminuria    Immunization deficiency      Past Medical History:   Diagnosis Date    Diabetes mellitus ([address])     Hyperlipidemia     Immunization deficiency 6/29/2020    Hep b nonimmune     Macular retinal edema     Microalbuminuria 6/29/2020    Retinopathy due to secondary diabetes ([address])     Type 2 diabetes mellitus ([address])       Past Surgical History:   Procedure Laterality Date    EYE SURGERY  5/2020    Right Vitrectomy    LIPOMA RESECTION      on back      Family History   Problem Relation Age of Onset    Hypertension Father     Stroke Father     Depression Father     Cancer Maternal Uncle      Social History     Tobacco Use    Smoking status: Former     Packs/day: 0.25     Years: 5.00     Total pack years: 1.25     Types: Cigarettes    Smokeless tobacco: Never   Substance Use Topics    Alcohol use:  Yes     Alcohol/week: 1.0 standard drink of alcohol     Types: 1 Cans of beer per week     Comment: occasional     No Known Allergies      Current Outpatient Medications:     atorvastatin (LIPITOR) 20 mg tablet, Take 1 tablet (20 mg total) by mouth daily, Disp: 90 tablet, Rfl: 0    dulaglutide (Trulicity) 1.5 QI/3.8AU injection, Inject 0.5 mL (1.5 mg total) under the skin every 7 days, Disp: 2 mL, Rfl: 2    Empagliflozin-metFORMIN HCl ER (Synjardy XR)  MG TB24, Take 1 each by mouth in the morning, Disp: 90 tablet, Rfl: 1    lisinopril (ZESTRIL) 2.5 mg tablet, Take 1 tablet (2.5 mg total) by mouth daily, Disp: 90 tablet, Rfl: 1    metFORMIN (GLUCOPHAGE-XR) 500 mg 24 hr tablet, Take 2 tablets (1,000 mg total) by mouth daily with breakfast, Disp: 100 tablet, Rfl: 0 Multiple Vitamin (multivitamin) tablet, Take 1 tablet by mouth daily, Disp: , Rfl:     Omega-3 Fatty Acids (FISH OIL PO), Take by mouth, Disp: , Rfl:     insulin aspart (NovoLOG FlexPen) 100 UNIT/ML injection pen, 8 units three times daily prior to meal (Patient not taking: Reported on 10/4/2023), Disp: 15 mL, Rfl: 1  Review of Systems   Constitutional:  Negative for diaphoresis, fatigue and unexpected weight change. Respiratory:  Negative for shortness of breath. Cardiovascular:  Negative for chest pain and palpitations. Gastrointestinal:  Negative for constipation and diarrhea. Endocrine: Negative for polydipsia and polyuria. Physical Exam:  Body mass index is 21.46 kg/m². /78   Pulse 98   Ht 5' 7" (1.702 m)   Wt 62.1 kg (137 lb)   SpO2 99%   BMI 21.46 kg/m²    Wt Readings from Last 3 Encounters:   10/04/23 62.1 kg (137 lb)   09/11/23 63 kg (139 lb)   04/12/23 62.2 kg (137 lb 3.2 oz)       Physical Exam  Constitutional:       Appearance: Normal appearance. He is normal weight. Cardiovascular:      Rate and Rhythm: Normal rate and regular rhythm. Pulses: Normal pulses. Dorsalis pedis pulses are 2+ on the right side and 2+ on the left side. Pulmonary:      Effort: Pulmonary effort is normal.   Abdominal:      General: Abdomen is flat. Palpations: Abdomen is soft. Feet:      Right foot:      Skin integrity: No ulcer, skin breakdown, erythema, warmth, callus or dry skin. Left foot:      Skin integrity: No ulcer, skin breakdown, erythema, warmth, callus or dry skin. Skin:     General: Skin is warm. Capillary Refill: Capillary refill takes less than 2 seconds. Neurological:      General: No focal deficit present. Mental Status: He is alert. Labs:    Latest Reference Range & Units 01/10/22 18:57 09/11/22 11:02 03/26/23 11:33 06/25/23 10:59   Hemoglobin A1C Normal 3.8-5.6%; PreDiabetic 5.7-6.4%;  Diabetic >=6.5%; Glycemic control for adults with diabetes <7.0% % 7.4 ! 7.4 (H) 7.7 (H) 7.1 (H)   eAG, EST AVG Glucose mg/dl  166 174 157   !: Data is abnormal  (H): Data is abnormally high     Latest Reference Range & Units 06/25/23 10:59   C-PEPTIDE 1.1 - 4.4 ng/mL 1.4      Latest Reference Range & Units 09/11/22 11:02 03/26/23 11:33 06/25/23 10:59   EXT Creatinine Urine mg/dL 106.0 69.2 109.0   MICROALBUMIN/CREATININE RATIO 0 - 30 mg/g creatinine 53 (H) 79 (H) 67 (H)   MICROALBUM.,U,RANDOM 0.0 - 20.0 mg/L 55.8 (H) 54.4 (H) 73.0 (H)   (H): Data is abnormally high     Latest Reference Range & Units 09/11/22 11:02 03/26/23 11:33   Cholesterol See Comment mg/dL 120 131   Triglycerides See Comment mg/dL 70 84   HDL >=40 mg/dL 44 48   Non-HDL Cholesterol mg/dl 76 83   LDL Calculated 0 - 100 mg/dL 62 66   LDL CHOLESTEROL DIRECT 0 - 100 mg/dl 65       Latest Reference Range & Units 06/25/23 10:59   Glutaminc Acid Decarboxylase 65 Ab 0.0 - 5.0 U/mL <5.0      06/26/23 00:00   Left Eye Diabetic Retinopathy Positive (E)   (E): External lab result     06/26/23 00:00   Right Eye Diabetic Retinopathy Positive (E)   (E): External lab result    Impression:  1. Microalbuminuria    2. Type 2 diabetes mellitus with hyperosmolarity without coma, without long-term current use of insulin ([address])    3. Mixed hyperlipidemia           Plan:    Casey Lucas was seen today for diabetes type 2. Diagnoses and all orders for this visit:    Microalbuminuria  -     dulaglutide (Trulicity) 1.5 NV/0.9FT injection; Inject 0.5 mL (1.5 mg total) under the skin every 7 days  -     lisinopril (ZESTRIL) 2.5 mg tablet; Take 1 tablet (2.5 mg total) by mouth daily  -     Hemoglobin A1C; Future  -     Comprehensive metabolic panel; Future  -     Albumin / creatinine urine ratio; Future  -     Lipid panel; Future    Type 2 diabetes mellitus with hyperosmolarity without coma, without long-term current use of insulin (HCC)  -     dulaglutide (Trulicity) 1.5 RQ/7.8IM injection;  Inject 0.5 mL (1.5 mg total) under the skin every 7 days  -     metFORMIN (GLUCOPHAGE-XR) 500 mg 24 hr tablet; Take 2 tablets (1,000 mg total) by mouth daily with breakfast  -     Empagliflozin-metFORMIN HCl ER (Synjardy XR)  MG TB24; Take 1 each by mouth in the morning  -     Hemoglobin A1C; Future  -     Comprehensive metabolic panel; Future  -     Albumin / creatinine urine ratio; Future  -     Lipid panel; Future    Mixed hyperlipidemia  -     atorvastatin (LIPITOR) 20 mg tablet; Take 1 tablet (20 mg total) by mouth daily  -     Hemoglobin A1C; Future  -     Comprehensive metabolic panel; Future  -     Albumin / creatinine urine ratio; Future  -     Lipid panel; Future      1. Type 2 diabetes mellitus with hyperosmolarity without coma, without long-term current use of insulin ([address])    2. Retinopathy due to secondary diabetes ([address])    3. Microalbuminuria    4. Mixed hyperlipidemia      Patient is a  48yM With PMHx of DM since 57WAX with complication of retinopathy b/l on injections/lazer therapy, microalbuminuria w/o CKD, no neuropathy or macrovascular complications, Other PMHx of hyperlipidemia Who presents today for diabetic care. Last visit 04/23    1) DM:- We do not have any recent A1C on file now. BG logs show stable fasting BG but does have some PPH, discussed he needs to check this often to understand his pathology further. Last A1C 7.1% 06/23 which is almost at goal. Discussed that he is on all medications which can cause weight loss with GLP-1 agonist having the highest weight loss, Discussed only insulin and ROBERTSON have weight gain properties, DPP-4 is weight neutral. For now will reduce trulicity to 1.6OD weekly and he will check his BG, if having PPH will start on glipizide then. Continue with metformin 1000mg daily and synjardy 25mg-1000mg daily for now.  Will also check urine again, if still abnormal, refer to nephrology    Plan   - c/w synjardy 25-1000mg daily and metformin 1000mg daily   - Reduce trulicity 6.0RW weekly  - Check BG daily- alternate times  - Ok to use Novolog emergency for hyperglycemia when eating poorly     Screening:-   Retinopathy- Uptodate,  Nephropathy- uptodate, abnormal, Will repeat in 3 months, on ACE inhibitor and also started on SGLT-2 antagonist. If continuous to worsen despite improved glycemic control will refer to nephrology  Lipide levels- Uptodate 06/23- normal. C/w lipitor 20mg     2) Hyperlipidemia:- LDL 06/23, LDL <100, c/w lipitor 20mg     RTC in 3 months     Discussed with the patient and all questioned fully answered. He will call me if any problems arise.     Counseled patient on diagnostic results, prognosis, risk and benefit of treatment options, instruction for management, importance of treatment compliance, Risk  factor reduction and impressions      Seble Doan MD

## 2023-10-06 NOTE — TELEPHONE ENCOUNTER
Denial from appeal received (although I didn't know it). Appeal is still denied as the max daily dose, per the FDA, is 25mg per day. Patient's request was for 25mg BID. Patient saw endo on 10/4, in which they recommended he take the medication daily along with the metformin as directed.      (I reached out to [facility] [place], spoke with Cherelle Garner to clarify as I did not realize the appeal denial was already provided)

## 2023-11-28 ENCOUNTER — TELEPHONE (OUTPATIENT)
Dept: FAMILY MEDICINE CLINIC | Facility: CLINIC | Age: 48
End: 2023-11-28

## 2023-11-28 NOTE — TELEPHONE ENCOUNTER
Patients wife called and wants to schedule a shingles vaccine for patient. Patient was just in on 9/11/23. Wife broke out with shingles and wants to prevent patient from getting it. Is it ok to schedule? Can call patients wife back at [phone].

## 2023-12-13 DIAGNOSIS — E11.00 TYPE 2 DIABETES MELLITUS WITH HYPEROSMOLARITY WITHOUT COMA, WITHOUT LONG-TERM CURRENT USE OF INSULIN (HCC): ICD-10-CM

## 2023-12-14 RX ORDER — EMPAGLIFLOZIN, METFORMIN HYDROCHLORIDE 25; 1000 MG/1; MG/1
1 TABLET, EXTENDED RELEASE ORAL DAILY
Qty: 90 TABLET | Refills: 0 | Status: SHIPPED | OUTPATIENT
Start: 2023-12-14

## 2024-01-01 DIAGNOSIS — R80.9 MICROALBUMINURIA: ICD-10-CM

## 2024-01-01 DIAGNOSIS — E78.2 MIXED HYPERLIPIDEMIA: ICD-10-CM

## 2024-01-02 RX ORDER — ATORVASTATIN CALCIUM 20 MG/1
20 TABLET, FILM COATED ORAL DAILY
Qty: 90 TABLET | Refills: 0 | Status: SHIPPED | OUTPATIENT
Start: 2024-01-02

## 2024-01-02 RX ORDER — LISINOPRIL 2.5 MG/1
2.5 TABLET ORAL DAILY
Qty: 90 TABLET | Refills: 0 | Status: SHIPPED | OUTPATIENT
Start: 2024-01-02 | End: 2024-06-30

## 2024-01-11 DIAGNOSIS — R80.9 MICROALBUMINURIA: ICD-10-CM

## 2024-01-11 DIAGNOSIS — E11.00 TYPE 2 DIABETES MELLITUS WITH HYPEROSMOLARITY WITHOUT COMA, WITHOUT LONG-TERM CURRENT USE OF INSULIN (HCC): ICD-10-CM

## 2024-01-12 RX ORDER — METFORMIN HYDROCHLORIDE 500 MG/1
1000 TABLET, EXTENDED RELEASE ORAL
Qty: 100 TABLET | Refills: 0 | Status: SHIPPED | OUTPATIENT
Start: 2024-01-12

## 2024-01-12 RX ORDER — DULAGLUTIDE 1.5 MG/.5ML
1.5 INJECTION, SOLUTION SUBCUTANEOUS
Qty: 2 ML | Refills: 0 | Status: SHIPPED | OUTPATIENT
Start: 2024-01-12

## 2024-01-19 DIAGNOSIS — R80.9 MICROALBUMINURIA: ICD-10-CM

## 2024-01-19 DIAGNOSIS — E11.00 TYPE 2 DIABETES MELLITUS WITH HYPEROSMOLARITY WITHOUT COMA, WITHOUT LONG-TERM CURRENT USE OF INSULIN (HCC): ICD-10-CM

## 2024-01-22 RX ORDER — DULAGLUTIDE 1.5 MG/.5ML
1.5 INJECTION, SOLUTION SUBCUTANEOUS
Qty: 6 ML | Refills: 0 | Status: SHIPPED | OUTPATIENT
Start: 2024-01-22

## 2024-02-13 ENCOUNTER — DOCUMENTATION (OUTPATIENT)
Dept: ENDOCRINOLOGY | Facility: HOSPITAL | Age: 49
End: 2024-02-13

## 2024-02-13 DIAGNOSIS — E11.00 TYPE 2 DIABETES MELLITUS WITH HYPEROSMOLARITY WITHOUT COMA, WITHOUT LONG-TERM CURRENT USE OF INSULIN (HCC): ICD-10-CM

## 2024-02-13 DIAGNOSIS — E11.00 TYPE 2 DIABETES MELLITUS WITH HYPEROSMOLARITY WITHOUT COMA, WITHOUT LONG-TERM CURRENT USE OF INSULIN (HCC): Primary | ICD-10-CM

## 2024-02-13 DIAGNOSIS — R80.9 MICROALBUMINURIA: ICD-10-CM

## 2024-02-13 RX ORDER — DULAGLUTIDE 0.75 MG/.5ML
0.75 INJECTION, SOLUTION SUBCUTANEOUS
COMMUNITY
End: 2024-02-13 | Stop reason: SDUPTHER

## 2024-02-13 RX ORDER — DULAGLUTIDE 0.75 MG/.5ML
0.75 INJECTION, SOLUTION SUBCUTANEOUS
Qty: 6 ML | Refills: 2 | Status: SHIPPED | OUTPATIENT
Start: 2024-02-13

## 2024-02-13 RX ORDER — DULAGLUTIDE 1.5 MG/.5ML
1.5 INJECTION, SOLUTION SUBCUTANEOUS
Qty: 6 ML | Refills: 0 | Status: SHIPPED | OUTPATIENT
Start: 2024-02-13

## 2024-03-05 DIAGNOSIS — E11.00 TYPE 2 DIABETES MELLITUS WITH HYPEROSMOLARITY WITHOUT COMA, WITHOUT LONG-TERM CURRENT USE OF INSULIN (HCC): ICD-10-CM

## 2024-03-05 DIAGNOSIS — E11.00 TYPE 2 DIABETES MELLITUS WITH HYPEROSMOLARITY WITHOUT COMA, WITHOUT LONG-TERM CURRENT USE OF INSULIN (HCC): Primary | ICD-10-CM

## 2024-03-05 RX ORDER — METFORMIN HYDROCHLORIDE 500 MG/1
1000 TABLET, EXTENDED RELEASE ORAL
Qty: 100 TABLET | Refills: 0 | Status: SHIPPED | OUTPATIENT
Start: 2024-03-05

## 2024-03-05 RX ORDER — DULAGLUTIDE 3 MG/.5ML
3 INJECTION, SOLUTION SUBCUTANEOUS
Qty: 2 ML | Refills: 2 | Status: SHIPPED | OUTPATIENT
Start: 2024-03-05

## 2024-03-12 DIAGNOSIS — R80.9 MICROALBUMINURIA: ICD-10-CM

## 2024-03-12 DIAGNOSIS — E78.2 MIXED HYPERLIPIDEMIA: ICD-10-CM

## 2024-03-12 DIAGNOSIS — E11.00 TYPE 2 DIABETES MELLITUS WITH HYPEROSMOLARITY WITHOUT COMA, WITHOUT LONG-TERM CURRENT USE OF INSULIN (HCC): ICD-10-CM

## 2024-03-12 RX ORDER — EMPAGLIFLOZIN, METFORMIN HYDROCHLORIDE 25; 1000 MG/1; MG/1
1 TABLET, EXTENDED RELEASE ORAL DAILY
Qty: 90 TABLET | Refills: 1 | Status: SHIPPED | OUTPATIENT
Start: 2024-03-12

## 2024-03-12 RX ORDER — LISINOPRIL 2.5 MG/1
2.5 TABLET ORAL DAILY
Qty: 90 TABLET | Refills: 1 | Status: SHIPPED | OUTPATIENT
Start: 2024-03-12 | End: 2024-09-08

## 2024-03-12 RX ORDER — ATORVASTATIN CALCIUM 20 MG/1
20 TABLET, FILM COATED ORAL DAILY
Qty: 90 TABLET | Refills: 1 | Status: SHIPPED | OUTPATIENT
Start: 2024-03-12

## 2024-03-30 ENCOUNTER — APPOINTMENT (OUTPATIENT)
Dept: LAB | Age: 49
End: 2024-03-30
Payer: COMMERCIAL

## 2024-03-30 DIAGNOSIS — Z00.8 HEALTH EXAMINATION IN POPULATION SURVEY: ICD-10-CM

## 2024-03-30 DIAGNOSIS — R80.9 MICROALBUMINURIA: ICD-10-CM

## 2024-03-30 DIAGNOSIS — E78.2 MIXED HYPERLIPIDEMIA: ICD-10-CM

## 2024-03-30 DIAGNOSIS — E11.00 TYPE 2 DIABETES MELLITUS WITH HYPEROSMOLARITY WITHOUT COMA, WITHOUT LONG-TERM CURRENT USE OF INSULIN (HCC): ICD-10-CM

## 2024-03-30 LAB
ALBUMIN SERPL BCP-MCNC: 4.2 G/DL (ref 3.5–5)
ALP SERPL-CCNC: 46 U/L (ref 34–104)
ALT SERPL W P-5'-P-CCNC: 23 U/L (ref 7–52)
ANION GAP SERPL CALCULATED.3IONS-SCNC: 8 MMOL/L (ref 4–13)
AST SERPL W P-5'-P-CCNC: 19 U/L (ref 13–39)
BILIRUB SERPL-MCNC: 0.57 MG/DL (ref 0.2–1)
BUN SERPL-MCNC: 19 MG/DL (ref 5–25)
CALCIUM SERPL-MCNC: 9.3 MG/DL (ref 8.4–10.2)
CHLORIDE SERPL-SCNC: 101 MMOL/L (ref 96–108)
CHOLEST SERPL-MCNC: 104 MG/DL
CO2 SERPL-SCNC: 28 MMOL/L (ref 21–32)
CREAT SERPL-MCNC: 0.7 MG/DL (ref 0.6–1.3)
CREAT UR-MCNC: 60.5 MG/DL
EST. AVERAGE GLUCOSE BLD GHB EST-MCNC: 189 MG/DL
GFR SERPL CREATININE-BSD FRML MDRD: 110 ML/MIN/1.73SQ M
GLUCOSE P FAST SERPL-MCNC: 141 MG/DL (ref 65–99)
HBA1C MFR BLD: 8.2 %
HDLC SERPL-MCNC: 39 MG/DL
LDLC SERPL CALC-MCNC: 47 MG/DL (ref 0–100)
MICROALBUMIN UR-MCNC: 48.7 MG/L
MICROALBUMIN/CREAT 24H UR: 80 MG/G CREATININE (ref 0–30)
NONHDLC SERPL-MCNC: 65 MG/DL
POTASSIUM SERPL-SCNC: 4.8 MMOL/L (ref 3.5–5.3)
PROT SERPL-MCNC: 6.9 G/DL (ref 6.4–8.4)
SODIUM SERPL-SCNC: 137 MMOL/L (ref 135–147)
TRIGL SERPL-MCNC: 92 MG/DL

## 2024-03-30 PROCEDURE — 83036 HEMOGLOBIN GLYCOSYLATED A1C: CPT

## 2024-03-30 PROCEDURE — 80061 LIPID PANEL: CPT

## 2024-03-30 PROCEDURE — 82043 UR ALBUMIN QUANTITATIVE: CPT

## 2024-03-30 PROCEDURE — 36415 COLL VENOUS BLD VENIPUNCTURE: CPT

## 2024-03-30 PROCEDURE — 82570 ASSAY OF URINE CREATININE: CPT

## 2024-03-30 PROCEDURE — 80053 COMPREHEN METABOLIC PANEL: CPT

## 2024-04-09 ENCOUNTER — OFFICE VISIT (OUTPATIENT)
Dept: ENDOCRINOLOGY | Facility: HOSPITAL | Age: 49
End: 2024-04-09
Payer: COMMERCIAL

## 2024-04-09 VITALS
HEIGHT: 67 IN | SYSTOLIC BLOOD PRESSURE: 110 MMHG | OXYGEN SATURATION: 98 % | WEIGHT: 139 LBS | BODY MASS INDEX: 21.82 KG/M2 | DIASTOLIC BLOOD PRESSURE: 70 MMHG | HEART RATE: 97 BPM

## 2024-04-09 DIAGNOSIS — E11.65 TYPE 2 DIABETES MELLITUS WITH HYPERGLYCEMIA, WITHOUT LONG-TERM CURRENT USE OF INSULIN (HCC): Primary | ICD-10-CM

## 2024-04-09 DIAGNOSIS — R80.9 MICROALBUMINURIA: ICD-10-CM

## 2024-04-09 DIAGNOSIS — E78.2 MIXED HYPERLIPIDEMIA: ICD-10-CM

## 2024-04-09 PROCEDURE — 99214 OFFICE O/P EST MOD 30 MIN: CPT | Performed by: PHYSICIAN ASSISTANT

## 2024-04-09 RX ORDER — INSULIN ASPART 100 [IU]/ML
INJECTION, SOLUTION INTRAVENOUS; SUBCUTANEOUS
Qty: 15 ML | Refills: 1 | Status: SHIPPED | OUTPATIENT
Start: 2024-04-09

## 2024-04-09 NOTE — PATIENT INSTRUCTIONS
Monitor diet and maintain physical activity.    Continue with current medications.    Make sure to check blood sugar daily at different times.    Call with any concerns or questions.    Follow up in 3 months with lab work prior to visit.

## 2024-04-09 NOTE — PROGRESS NOTES
Marcos Meeks 48 y.o. male MRN: [id number]    Encounter: [id number]      Assessment/Plan     Assessment:  This is a 48 y.o.-year-old male with type 2 diabetes with microalbuminuria and hyperlipidemia.    Plan:  1.  Type 2 diabetes: Most recent hemoglobin A1c has increased to 8.2.  This is likely due to not being on Trulicity for a while.  At this time has not had any issues and fasting glucose levels are doing well.  Encouraged him to continue with Trulicity 3 mg weekly, Synjardy  mg daily, metformin 1000 mg daily.  He did does utilize NovoLog for excessively high glucose levels.  We did discuss the importance of checking blood sugar at alternate times throughout the day to see how we can make adjustments to his medication.  Fasting blood sugars may be well, but if he is having spikes later in the day we need to know.  We may need to start on a sulfonylurea to prevent the spikes.  Continue with lifestyle modifications to help improve glucose levels.  Contact the office with any concerns or questions.  Follow-up in 3 months with lab work completed prior to visit.    2.  Microalbuminuria: Levels are stable.  Is currently on Jardiance and lisinopril.  Improvement in glucose levels may help minimize these levels.    3.  Hyperlipidemia: Lipid panel excellent at this time.  Continue with atorvastatin 20 mg daily.  We will continue to monitor over time.    CC: Type 2 diabetes follow-up    History of Present Illness     HPI:  Marcos Meeks is a 48 y.o. year old male with type 2 diabetes for 10 years.  He is on oral agents at home and takes Synjardy  milligram daily, metformin 1000 mg daily, Trulicity 3 mg weekly. He denies any polyuria, polydipsia, nocturia and blurry vision.  He denies neuropathy, heart attack, stroke, and claudication but does admit to nephropathy and retinopathy.  Overall he is doing well today.  He was having issues with receiving his Trulicity.  Previously on 1.5 mg weekly but then was  decreased to 0.75 mg weekly due to supply issues.  Around that time glucose levels were running high, so was decided to increase to 3 mg weekly.  Is doing fine on the higher dose.  Still has some concerns with weight loss.  Has not made any significant dietary changes since last office visit.  Is physically active at work, but minimal activity outside of work.    Hypoglycemic episodes: No.    The patient's last eye exam was in June 2023.  Does have bilateral PDR which required laser treatment and also VEGF injections every 6 weeks.  The patient's last foot exam was in April 2023.    Blood Sugar/Glucometer/Pump/CGM review: Currently checking fasting glucose levels on a daily basis.  Average blood sugars around 153.  79% blood sugars checked are within target range.  General he is between 130 and 150.    Hyperlipidemia is currently on atorvastatin 20 mg daily.  Denies any headaches, vision changes, chest pain, MI or strokelike symptoms.     Review of Systems   Constitutional:  Negative for activity change, appetite change, fatigue and unexpected weight change.   HENT:  Negative for trouble swallowing.    Eyes:  Negative for visual disturbance.   Respiratory:  Negative for chest tightness and shortness of breath.    Cardiovascular:  Negative for chest pain, palpitations and leg swelling.   Gastrointestinal:  Negative for abdominal pain, diarrhea, nausea and vomiting.   Endocrine: Negative for cold intolerance, heat intolerance, polydipsia, polyphagia and polyuria.   Genitourinary:  Negative for frequency.   Skin:  Negative for rash and wound.   Neurological:  Negative for dizziness, weakness, light-headedness, numbness and headaches.   Psychiatric/Behavioral:  Negative for dysphoric mood and sleep disturbance. The patient is not nervous/anxious.        Historical Information   Past Medical History:   Diagnosis Date   • Diabetes mellitus (HCC)    • Hyperlipidemia    • Immunization deficiency 6/29/2020    Hep b nonimmune     • Macular retinal edema    • Microalbuminuria 6/29/2020   • Retinopathy due to secondary diabetes (HCC)    • Type 2 diabetes mellitus (HCC)      Past Surgical History:   Procedure Laterality Date   • EYE SURGERY  5/2020    Right Vitrectomy   • LIPOMA RESECTION      on back     Social History   Social History     Substance and Sexual Activity   Alcohol Use Yes   • Alcohol/week: 1.0 standard drink of alcohol   • Types: 1 Cans of beer per week    Comment: occasional     Social History     Substance and Sexual Activity   Drug Use Never     Social History     Tobacco Use   Smoking Status Former   • Current packs/day: 0.25   • Average packs/day: 0.3 packs/day for 5.2 years (1.3 ttl pk-yrs)   • Types: Cigarettes   Smokeless Tobacco Never     Family History:   Family History   Problem Relation Age of Onset   • Hypertension Father    • Stroke Father    • Depression Father    • Cancer Maternal Uncle        Meds/Allergies   Current Outpatient Medications   Medication Sig Dispense Refill   • atorvastatin (LIPITOR) 20 mg tablet Take 1 tablet (20 mg total) by mouth daily 90 tablet 1   • dulaglutide (Trulicity) 3 MG/0.5ML injection Inject 0.5 mL (3 mg total) under the skin every 7 days 2 mL 2   • Empagliflozin-metFORMIN HCl ER (Synjardy XR)  MG TB24 Take 1 each by mouth in the morning 90 tablet 1   • insulin aspart (NovoLOG FlexPen) 100 UNIT/ML injection pen 8 units three times daily prior to meal 15 mL 1   • lisinopril (ZESTRIL) 2.5 mg tablet Take 1 tablet (2.5 mg total) by mouth daily 90 tablet 1   • metFORMIN (GLUCOPHAGE-XR) 500 mg 24 hr tablet Take 2 tablets (1,000 mg total) by mouth daily with breakfast 100 tablet 0   • metFORMIN (GLUCOPHAGE-XR) 500 mg 24 hr tablet Take 2 tablets (1,000 mg total) by mouth daily with breakfast 100 tablet 0   • Multiple Vitamin (multivitamin) tablet Take 1 tablet by mouth daily     • Omega-3 Fatty Acids (FISH OIL PO) Take by mouth       No current facility-administered medications for  "this visit.     No Known Allergies    Objective   Vitals: Blood pressure 110/70, pulse 97, height 5' 7\" (1.702 m), weight 63 kg (139 lb), SpO2 98%.    Physical Exam  Vitals and nursing note reviewed.   Constitutional:       General: He is not in acute distress.     Appearance: Normal appearance. He is not diaphoretic.   HENT:      Head: Normocephalic and atraumatic.   Eyes:      General: No scleral icterus.     Extraocular Movements: Extraocular movements intact.      Conjunctiva/sclera: Conjunctivae normal.      Pupils: Pupils are equal, round, and reactive to light.   Neck:      Thyroid: No thyroid mass, thyromegaly or thyroid tenderness.   Cardiovascular:      Rate and Rhythm: Normal rate and regular rhythm.      Heart sounds: No murmur heard.  Pulmonary:      Effort: Pulmonary effort is normal. No respiratory distress.      Breath sounds: Normal breath sounds. No wheezing.   Musculoskeletal:      Cervical back: Normal range of motion.      Right lower leg: No edema.      Left lower leg: No edema.   Lymphadenopathy:      Cervical: No cervical adenopathy.   Skin:     General: Skin is warm and dry.   Neurological:      Mental Status: He is alert and oriented to person, place, and time. Mental status is at baseline.      Sensory: No sensory deficit.      Gait: Gait normal.   Psychiatric:         Mood and Affect: Mood normal.         Behavior: Behavior normal.         Thought Content: Thought content normal.         The history was obtained from the review of the chart, patient.    Lab Results:   Lab Results   Component Value Date/Time    Hemoglobin A1C 8.2 (H) 03/30/2024 08:32 AM    Hemoglobin A1C 7.1 (H) 06/25/2023 10:59 AM    BUN 19 03/30/2024 08:32 AM    Potassium 4.8 03/30/2024 08:32 AM    Chloride 101 03/30/2024 08:32 AM    CO2 28 03/30/2024 08:32 AM    Creatinine 0.70 03/30/2024 08:32 AM    AST 19 03/30/2024 08:32 AM    ALT 23 03/30/2024 08:32 AM    Total Protein 6.9 03/30/2024 08:32 AM    Albumin 4.2 " "03/30/2024 08:32 AM    HDL, Direct 39 (L) 03/30/2024 08:32 AM    HDL, Direct 48 06/25/2023 10:59 AM    Triglycerides 92 03/30/2024 08:32 AM    Triglycerides 90 06/25/2023 10:59 AM     Component      Latest Ref Rng 3/30/2024   EXT Creatinine Urine      Reference range not established. mg/dL 60.5    Albumin,U,Random      <20.0 mg/L 48.7 (H)    Albumin Creat Ratio      0 - 30 mg/g creatinine 80 (H)       Legend:  (H) High    Portions of the record may have been created with voice recognition software. Occasional wrong word or \"sound a like\" substitutions may have occurred due to the inherent limitations of voice recognition software. Read the chart carefully and recognize, using context, where substitutions have occurred.    "

## 2024-04-22 DIAGNOSIS — E11.00 TYPE 2 DIABETES MELLITUS WITH HYPEROSMOLARITY WITHOUT COMA, WITHOUT LONG-TERM CURRENT USE OF INSULIN (HCC): ICD-10-CM

## 2024-04-23 RX ORDER — METFORMIN HYDROCHLORIDE 500 MG/1
1000 TABLET, EXTENDED RELEASE ORAL
Qty: 100 TABLET | Refills: 0 | OUTPATIENT
Start: 2024-04-23

## 2024-04-23 RX ORDER — METFORMIN HYDROCHLORIDE 500 MG/1
1000 TABLET, EXTENDED RELEASE ORAL
Qty: 180 TABLET | Refills: 1 | Status: SHIPPED | OUTPATIENT
Start: 2024-04-23

## 2024-05-06 DIAGNOSIS — E11.00 TYPE 2 DIABETES MELLITUS WITH HYPEROSMOLARITY WITHOUT COMA, WITHOUT LONG-TERM CURRENT USE OF INSULIN (HCC): ICD-10-CM

## 2024-05-07 RX ORDER — DULAGLUTIDE 3 MG/.5ML
3 INJECTION, SOLUTION SUBCUTANEOUS
Qty: 2 ML | Refills: 5 | Status: SHIPPED | OUTPATIENT
Start: 2024-05-07

## 2024-05-31 DIAGNOSIS — E11.00 TYPE 2 DIABETES MELLITUS WITH HYPEROSMOLARITY WITHOUT COMA, WITHOUT LONG-TERM CURRENT USE OF INSULIN (HCC): ICD-10-CM

## 2024-05-31 DIAGNOSIS — E78.2 MIXED HYPERLIPIDEMIA: ICD-10-CM

## 2024-05-31 DIAGNOSIS — R80.9 MICROALBUMINURIA: ICD-10-CM

## 2024-05-31 RX ORDER — DULAGLUTIDE 3 MG/.5ML
3 INJECTION, SOLUTION SUBCUTANEOUS
Qty: 2 ML | Refills: 5 | Status: SHIPPED | OUTPATIENT
Start: 2024-05-31

## 2024-05-31 RX ORDER — ATORVASTATIN CALCIUM 20 MG/1
20 TABLET, FILM COATED ORAL DAILY
Qty: 90 TABLET | Refills: 1 | Status: SHIPPED | OUTPATIENT
Start: 2024-05-31

## 2024-05-31 RX ORDER — LISINOPRIL 2.5 MG/1
2.5 TABLET ORAL DAILY
Qty: 90 TABLET | Refills: 1 | Status: SHIPPED | OUTPATIENT
Start: 2024-05-31 | End: 2024-11-27

## 2024-05-31 RX ORDER — EMPAGLIFLOZIN, METFORMIN HYDROCHLORIDE 25; 1000 MG/1; MG/1
1 TABLET, EXTENDED RELEASE ORAL DAILY
Qty: 90 TABLET | Refills: 1 | Status: SHIPPED | OUTPATIENT
Start: 2024-05-31

## 2024-06-04 DIAGNOSIS — E11.00 TYPE 2 DIABETES MELLITUS WITH HYPEROSMOLARITY WITHOUT COMA, WITHOUT LONG-TERM CURRENT USE OF INSULIN (HCC): Primary | ICD-10-CM

## 2024-06-04 RX ORDER — DULAGLUTIDE 1.5 MG/.5ML
1.5 INJECTION, SOLUTION SUBCUTANEOUS
Qty: 2 ML | Refills: 2 | Status: SHIPPED | OUTPATIENT
Start: 2024-06-04

## 2024-06-16 ENCOUNTER — TELEPHONE (OUTPATIENT)
Age: 49
End: 2024-06-16

## 2024-06-16 NOTE — TELEPHONE ENCOUNTER
PA for TRULICITY 1.5 not required     ReasonThe [facility] Prior Authorization Team is unable to review this request for prior authorization as the requested medication is on formulary and does not require a prior authorization              Message sent to provider pool yes

## 2024-08-24 DIAGNOSIS — E11.00 TYPE 2 DIABETES MELLITUS WITH HYPEROSMOLARITY WITHOUT COMA, WITHOUT LONG-TERM CURRENT USE OF INSULIN (HCC): ICD-10-CM

## 2024-08-26 RX ORDER — DULAGLUTIDE 1.5 MG/.5ML
1.5 INJECTION, SOLUTION SUBCUTANEOUS
Qty: 2 ML | Refills: 0 | Status: SHIPPED | OUTPATIENT
Start: 2024-08-26

## 2024-08-26 NOTE — TELEPHONE ENCOUNTER
Requested medication(s) are due for refill today: Yes  Patient has already received a courtesy refill: No  Other reason request has been forwarded to provider: guidelines not met

## 2024-09-08 DIAGNOSIS — E11.00 TYPE 2 DIABETES MELLITUS WITH HYPEROSMOLARITY WITHOUT COMA, WITHOUT LONG-TERM CURRENT USE OF INSULIN (HCC): ICD-10-CM

## 2024-09-08 DIAGNOSIS — R80.9 MICROALBUMINURIA: ICD-10-CM

## 2024-09-08 DIAGNOSIS — E78.2 MIXED HYPERLIPIDEMIA: ICD-10-CM

## 2024-09-08 RX ORDER — ATORVASTATIN CALCIUM 20 MG/1
20 TABLET, FILM COATED ORAL DAILY
Qty: 90 TABLET | Refills: 1 | Status: SHIPPED | OUTPATIENT
Start: 2024-09-08

## 2024-09-08 RX ORDER — LISINOPRIL 2.5 MG/1
2.5 TABLET ORAL DAILY
Qty: 90 TABLET | Refills: 1 | Status: SHIPPED | OUTPATIENT
Start: 2024-09-08 | End: 2025-03-07

## 2024-09-08 RX ORDER — EMPAGLIFLOZIN, METFORMIN HYDROCHLORIDE 25; 1000 MG/1; MG/1
1 TABLET, EXTENDED RELEASE ORAL DAILY
Qty: 90 TABLET | Refills: 1 | Status: SHIPPED | OUTPATIENT
Start: 2024-09-08

## 2024-09-08 RX ORDER — METFORMIN HCL 500 MG
1000 TABLET, EXTENDED RELEASE 24 HR ORAL
Qty: 100 TABLET | Refills: 1 | Status: SHIPPED | OUTPATIENT
Start: 2024-09-08

## 2024-09-08 NOTE — TELEPHONE ENCOUNTER
5 months ago Greg Urbina PA-C [facility] [place] Office Visit

## 2024-09-15 ENCOUNTER — APPOINTMENT (OUTPATIENT)
Dept: LAB | Age: 49
End: 2024-09-15
Payer: COMMERCIAL

## 2024-09-15 DIAGNOSIS — E11.65 TYPE 2 DIABETES MELLITUS WITH HYPERGLYCEMIA, WITHOUT LONG-TERM CURRENT USE OF INSULIN (HCC): ICD-10-CM

## 2024-09-15 LAB
ALBUMIN SERPL BCG-MCNC: 4.4 G/DL (ref 3.5–5)
ALP SERPL-CCNC: 56 U/L (ref 34–104)
ALT SERPL W P-5'-P-CCNC: 19 U/L (ref 7–52)
ANION GAP SERPL CALCULATED.3IONS-SCNC: 8 MMOL/L (ref 4–13)
AST SERPL W P-5'-P-CCNC: 18 U/L (ref 13–39)
BILIRUB SERPL-MCNC: 0.47 MG/DL (ref 0.2–1)
BUN SERPL-MCNC: 12 MG/DL (ref 5–25)
CALCIUM SERPL-MCNC: 10.4 MG/DL (ref 8.4–10.2)
CHLORIDE SERPL-SCNC: 102 MMOL/L (ref 96–108)
CO2 SERPL-SCNC: 30 MMOL/L (ref 21–32)
CREAT SERPL-MCNC: 0.87 MG/DL (ref 0.6–1.3)
EST. AVERAGE GLUCOSE BLD GHB EST-MCNC: 189 MG/DL
GFR SERPL CREATININE-BSD FRML MDRD: 99 ML/MIN/1.73SQ M
GLUCOSE P FAST SERPL-MCNC: 156 MG/DL (ref 65–99)
HBA1C MFR BLD: 8.2 %
POTASSIUM SERPL-SCNC: 4.6 MMOL/L (ref 3.5–5.3)
PROT SERPL-MCNC: 7 G/DL (ref 6.4–8.4)
SODIUM SERPL-SCNC: 140 MMOL/L (ref 135–147)

## 2024-09-15 PROCEDURE — 80053 COMPREHEN METABOLIC PANEL: CPT

## 2024-09-15 PROCEDURE — 83036 HEMOGLOBIN GLYCOSYLATED A1C: CPT

## 2024-09-15 PROCEDURE — 36415 COLL VENOUS BLD VENIPUNCTURE: CPT

## 2024-09-22 DIAGNOSIS — E11.00 TYPE 2 DIABETES MELLITUS WITH HYPEROSMOLARITY WITHOUT COMA, WITHOUT LONG-TERM CURRENT USE OF INSULIN (HCC): ICD-10-CM

## 2024-09-23 RX ORDER — DULAGLUTIDE 1.5 MG/.5ML
1.5 INJECTION, SOLUTION SUBCUTANEOUS
Qty: 2 ML | Refills: 2 | Status: SHIPPED | OUTPATIENT
Start: 2024-09-23 | End: 2024-09-24 | Stop reason: DRUGHIGH

## 2024-09-24 ENCOUNTER — OFFICE VISIT (OUTPATIENT)
Dept: ENDOCRINOLOGY | Facility: HOSPITAL | Age: 49
End: 2024-09-24
Payer: COMMERCIAL

## 2024-09-24 VITALS
OXYGEN SATURATION: 100 % | DIASTOLIC BLOOD PRESSURE: 88 MMHG | WEIGHT: 139 LBS | SYSTOLIC BLOOD PRESSURE: 142 MMHG | HEIGHT: 67 IN | HEART RATE: 68 BPM | BODY MASS INDEX: 21.82 KG/M2

## 2024-09-24 DIAGNOSIS — E11.00 TYPE 2 DIABETES MELLITUS WITH HYPEROSMOLARITY WITHOUT COMA, WITHOUT LONG-TERM CURRENT USE OF INSULIN (HCC): Primary | ICD-10-CM

## 2024-09-24 PROCEDURE — 99214 OFFICE O/P EST MOD 30 MIN: CPT | Performed by: PHYSICIAN ASSISTANT

## 2024-09-24 RX ORDER — DULAGLUTIDE 3 MG/.5ML
3 INJECTION, SOLUTION SUBCUTANEOUS
Qty: 6 ML | Refills: 1 | Status: SHIPPED | OUTPATIENT
Start: 2024-09-24

## 2024-09-24 NOTE — PATIENT INSTRUCTIONS
Monitor diet and maintain physical activity.     Increase Trulicity to 3 mg weekly. Keep all other medications the same at this time.     Make sure to check blood sugar daily at different times. If still running high, please let me know in between office visit.     Call with any concerns or questions.     Follow up in 3 months with lab work prior to visit.

## 2024-09-24 NOTE — PROGRESS NOTES
Marcos Meeks 49 y.o. male MRN: [id number]    Encounter: [id number]      Assessment & Plan     Assessment:  This is a 49 y.o.-year-old male with type 2 diabetes with microalbuminuria and hyperlipidemia.    Plan:  1.  Type 2 diabetes: Most recent hemoglobin A1c has increased to 8.2.  At 1 point was supposed to be on Trulicity 3 mg weekly, but due to supply issues he was decreased to 1.5 mg weekly.  His A1c is stable at this time, but needs to decrease.  I would like him to increase his Trulicity back to 3 mg weekly and he will continue with Synjardy  mg daily and metformin 1000 mg daily.  He did does utilize NovoLog for excessively high glucose levels.  We did discuss the importance of checking blood sugar at alternate times throughout the day to see how we can make adjustments to his medication.  Fasting blood sugars may be well, but if he is having spikes later in the day we need to know.  We may need to start on a sulfonylurea to prevent the spikes.  Continue with lifestyle modifications to help improve glucose levels.  Contact the office with any concerns or questions.  Follow-up in 3 months with lab work completed prior to visit.     2.  Microalbuminuria: Levels are stable.  Is currently on Jardiance and lisinopril.  Improvement in glucose levels may help minimize these levels.     3.  Hyperlipidemia: Lipid panel excellent at this time.  Continue with atorvastatin 20 mg daily.  We will continue to monitor over time.    CC: Type 2 diabetes follow-up    History of Present Illness     HPI:  Marcos Meeks is a 48 y.o. year old male with type 2 diabetes for 10 years.  He is on oral agents at home and takes Synjardy  milligram daily, metformin 1000 mg daily, Trulicity 1.5 mg weekly.  Was having issues getting Trulicity 3 mg weekly so was reduced to the 1.5 mg weekly dose.  He denies any polyuria, polydipsia, nocturia and blurry vision.  He denies neuropathy, heart attack, stroke, and claudication but  does admit to nephropathy and retinopathy.  Overall he is doing well today.  Weight is stable at this time.  Has not made any significant dietary changes since last office visit.  Is physically active at work, but minimal activity outside of work.     Hypoglycemic episodes: No.     The patient's last eye exam was in June 2023.  Does have bilateral PDR which required laser treatment and also VEGF injections every 6 weeks.  The patient's last foot exam was in April 2023.     Blood Sugar/Glucometer/Pump/CGM review: No blood sugar logs present at today's office visit.  Does state that he is checking variable times throughout the day.  Glucose levels are typically in the mid 100s.     Hyperlipidemia is currently on atorvastatin 20 mg daily.  Denies any headaches, vision changes, chest pain, MI or strokelike symptoms.     Review of Systems   Constitutional:  Negative for activity change, appetite change, fatigue and unexpected weight change.   HENT:  Negative for trouble swallowing.    Eyes:  Negative for visual disturbance.   Respiratory:  Negative for chest tightness and shortness of breath.    Cardiovascular:  Negative for chest pain, palpitations and leg swelling.   Gastrointestinal:  Negative for abdominal pain, diarrhea, nausea and vomiting.   Endocrine: Negative for cold intolerance, heat intolerance, polydipsia, polyphagia and polyuria.   Genitourinary:  Negative for frequency.   Skin:  Negative for rash and wound.   Neurological:  Negative for dizziness, weakness, light-headedness, numbness and headaches.   Psychiatric/Behavioral:  Negative for dysphoric mood and sleep disturbance. The patient is not nervous/anxious.        Historical Information   Past Medical History:   Diagnosis Date    Diabetes mellitus (HCC)     Hyperlipidemia     Immunization deficiency 6/29/2020    Hep b nonimmune     Macular retinal edema     Microalbuminuria 6/29/2020    Retinopathy due to secondary diabetes (HCC)     Type 2 diabetes  mellitus (HCC)      Past Surgical History:   Procedure Laterality Date    EYE SURGERY  5/2020    Right Vitrectomy    LIPOMA RESECTION      on back     Social History   Social History     Substance and Sexual Activity   Alcohol Use Yes    Alcohol/week: 1.0 standard drink of alcohol    Types: 1 Cans of beer per week    Comment: occasional     Social History     Substance and Sexual Activity   Drug Use Never     Social History     Tobacco Use   Smoking Status Former    Current packs/day: 0.25    Average packs/day: 0.3 packs/day for 5.2 years (1.3 ttl pk-yrs)    Types: Cigarettes   Smokeless Tobacco Never     Family History:   Family History   Problem Relation Age of Onset    Hypertension Father     Stroke Father     Depression Father     Cancer Maternal Uncle        Meds/Allergies   Current Outpatient Medications   Medication Sig Dispense Refill    atorvastatin (LIPITOR) 20 mg tablet Take 1 tablet (20 mg total) by mouth daily 90 tablet 1    dulaglutide (Trulicity) 1.5 MG/0.5ML injection Inject 0.5 mL (1.5 mg total) under the skin every 7 days Replacement until 3mg dose available 2 mL 2    dulaglutide (Trulicity) 3 MG/0.5ML injection Inject 0.5 mL (3 mg total) under the skin every 7 days 2 mL 5    Empagliflozin-metFORMIN HCl ER (Synjardy XR)  MG TB24 Take 1 each by mouth in the morning 90 tablet 1    insulin aspart (NovoLOG FlexPen) 100 UNIT/ML injection pen 8 units three times daily prior to meal 15 mL 1    lisinopril (ZESTRIL) 2.5 mg tablet Take 1 tablet (2.5 mg total) by mouth daily 90 tablet 1    metFORMIN (GLUCOPHAGE-XR) 500 mg 24 hr tablet Take 2 tablets (1,000 mg total) by mouth daily with breakfast 180 tablet 1    metFORMIN (GLUCOPHAGE-XR) 500 mg 24 hr tablet Take 2 tablets (1,000 mg total) by mouth daily with breakfast 100 tablet 1    Multiple Vitamin (multivitamin) tablet Take 1 tablet by mouth daily      Omega-3 Fatty Acids (FISH OIL PO) Take by mouth       No current facility-administered medications  for this visit.     No Known Allergies    Objective   Vitals: There were no vitals taken for this visit.    Physical Exam  Vitals and nursing note reviewed.   Constitutional:       General: He is not in acute distress.     Appearance: Normal appearance. He is not diaphoretic.   HENT:      Head: Normocephalic and atraumatic.   Eyes:      General: No scleral icterus.     Extraocular Movements: Extraocular movements intact.      Conjunctiva/sclera: Conjunctivae normal.      Pupils: Pupils are equal, round, and reactive to light.   Cardiovascular:      Rate and Rhythm: Normal rate and regular rhythm.      Pulses: no weak pulses.           Dorsalis pedis pulses are 2+ on the right side and 2+ on the left side.        Posterior tibial pulses are 2+ on the right side and 2+ on the left side.      Heart sounds: No murmur heard.  Pulmonary:      Effort: Pulmonary effort is normal. No respiratory distress.      Breath sounds: Normal breath sounds. No wheezing.   Musculoskeletal:      Right lower leg: No edema.      Left lower leg: No edema.   Feet:      Right foot:      Skin integrity: No ulcer, skin breakdown, erythema, warmth, callus or dry skin.      Left foot:      Skin integrity: No ulcer, skin breakdown, erythema, warmth, callus or dry skin.   Lymphadenopathy:      Cervical: No cervical adenopathy.   Skin:     General: Skin is warm and dry.   Neurological:      Mental Status: He is alert and oriented to person, place, and time. Mental status is at baseline.      Sensory: No sensory deficit.      Gait: Gait normal.   Psychiatric:         Mood and Affect: Mood normal.         Behavior: Behavior normal.         Thought Content: Thought content normal.     Patient's shoes and socks removed.    Right Foot/Ankle   Right Foot Inspection  Skin Exam: skin normal and skin intact. No dry skin, no warmth, no callus, no erythema, no maceration, no abnormal color, no pre-ulcer, no ulcer and no callus.     Toe Exam: ROM and strength  "within normal limits. No tenderness, erythema and  no right toe deformity    Sensory   Proprioception: intact  Monofilament testing: intact    Vascular  Capillary refills: < 3 seconds  The right DP pulse is 2+. The right PT pulse is 2+.     Left Foot/Ankle  Left Foot Inspection  Skin Exam: skin normal and skin intact. No dry skin, no warmth, no erythema, no maceration, normal color, no pre-ulcer, no ulcer and no callus.     Toe Exam: ROM and strength within normal limits. No tenderness, no erythema and no left toe deformity.     Sensory   Proprioception: intact  Monofilament testing: intact    Vascular  Capillary refills: < 3 seconds  The left DP pulse is 2+. The left PT pulse is 2+.     Assign Risk Category  No deformity present  No loss of protective sensation  No weak pulses  Risk: 0        The history was obtained from the review of the chart, patient.    Lab Results:   Lab Results   Component Value Date/Time    Hemoglobin A1C 8.2 (H) 09/15/2024 08:19 AM    Hemoglobin A1C 8.2 (H) 03/30/2024 08:32 AM    BUN 12 09/15/2024 08:19 AM    BUN 19 03/30/2024 08:32 AM    Potassium 4.6 09/15/2024 08:19 AM    Potassium 4.8 03/30/2024 08:32 AM    Chloride 102 09/15/2024 08:19 AM    Chloride 101 03/30/2024 08:32 AM    CO2 30 09/15/2024 08:19 AM    CO2 28 03/30/2024 08:32 AM    Creatinine 0.87 09/15/2024 08:19 AM    Creatinine 0.70 03/30/2024 08:32 AM    AST 18 09/15/2024 08:19 AM    AST 19 03/30/2024 08:32 AM    ALT 19 09/15/2024 08:19 AM    ALT 23 03/30/2024 08:32 AM    Total Protein 7.0 09/15/2024 08:19 AM    Total Protein 6.9 03/30/2024 08:32 AM    Albumin 4.4 09/15/2024 08:19 AM    Albumin 4.2 03/30/2024 08:32 AM    HDL, Direct 39 (L) 03/30/2024 08:32 AM    Triglycerides 92 03/30/2024 08:32 AM         Portions of the record may have been created with voice recognition software. Occasional wrong word or \"sound a like\" substitutions may have occurred due to the inherent limitations of voice recognition software. Read the " chart carefully and recognize, using context, where substitutions have occurred.

## 2024-09-25 ENCOUNTER — TELEPHONE (OUTPATIENT)
Dept: ADMINISTRATIVE | Facility: OTHER | Age: 49
End: 2024-09-25

## 2024-09-25 NOTE — LETTER
Diabetic Eye Exam Form    Date Requested: 10/23/24  Patient: Marcos Meeks  Patient : 1975   Referring Provider: Maurice Chance DO      DIABETIC Eye Exam Date _______________________________      Type of Exam MUST be documented for Diabetic Eye Exams. Please CHECK ONE.     Retinal Exam       Dilated Retinal Exam       OCT       Optomap-Iris Exam      Fundus Photography       Left Eye - Please check Retinopathy or No Retinopathy        Exam did show retinopathy    Exam did not show retinopathy       Right Eye - Please check Retinopathy or No Retinopathy       Exam did show retinopathy    Exam did not show retinopathy       Comments __________________________________________________________    Practice Providing Exam ______________________________________________    Exam Performed By (print name) _______________________________________      Provider Signature ___________________________________________________      These reports are needed for  compliance.  Please fax this completed form and a copy of the Diabetic Eye Exam report to our office located at [address] [place] [address] as soon as possible via Fax [phone] attention Ant: Phone [phone]  We thank you for your assistance in treating our mutual patient.

## 2024-09-25 NOTE — TELEPHONE ENCOUNTER
Upon review of the In Basket request and the patient's chart, initial outreach has been made via fax to facility. Please see Contacts section for details.     Thank you  Ant Torres MA

## 2024-09-25 NOTE — TELEPHONE ENCOUNTER
----- Message from Kasia NUGENT sent at 9/24/2024  2:01 PM EDT -----  Regarding: diabetic eye exam  09/24/24 2:04 PM    Gonzalo, our patient Marcos Meeks has had Diabetic Eye Exam completed/performed. Please assist in updating the patient chart by making an External outreach to [facility] located in [facility]. The date of service is 2024.    Thank you,  Kasia Moreau MA  [facility] [place]

## 2024-09-25 NOTE — LETTER
Diabetic Eye Exam Form    Date Requested: 24  Patient: Marcos Meeks  Patient : 1975   Referring Provider: Maurice Chance DO      DIABETIC Eye Exam Date _______________________________      Type of Exam MUST be documented for Diabetic Eye Exams. Please CHECK ONE.     Retinal Exam       Dilated Retinal Exam       OCT       Optomap-Iris Exam      Fundus Photography       Left Eye - Please check Retinopathy or No Retinopathy        Exam did show retinopathy    Exam did not show retinopathy       Right Eye - Please check Retinopathy or No Retinopathy       Exam did show retinopathy    Exam did not show retinopathy       Comments __________________________________________________________    Practice Providing Exam ______________________________________________    Exam Performed By (print name) _______________________________________      Provider Signature ___________________________________________________      These reports are needed for  compliance.  Please fax this completed form and a copy of the Diabetic Eye Exam report to our office located at [address] [place] [address] as soon as possible via Fax [phone] attention Ant: Phone [phone]  We thank you for your assistance in treating our mutual patient.

## 2024-09-27 ENCOUNTER — TELEPHONE (OUTPATIENT)
Age: 49
End: 2024-09-27

## 2024-09-27 NOTE — TELEPHONE ENCOUNTER
PA for   dulaglutide (Trulicity) 3 MG/0.5ML injection [  NOT REQUIRED     Reason Prior Authorization not required for patient/medication           Patient advised by          [] MyChart Message  [] Phone call   []LMOM  []L/M to call office as no active Communication consent on file  []Unable to leave detailed message as VM not approved on Communication consent       Pharmacy advised by    [x]Fax  []Phone call

## 2024-10-16 NOTE — TELEPHONE ENCOUNTER
As a follow-up, a second attempt has been made for outreach via telephone call to facility. Please see Contacts section for details.    Thank you  Ant Torres MA

## 2024-10-23 NOTE — TELEPHONE ENCOUNTER
As a final attempt, a third outreach has been made via fax to facility. Please see Contacts section for details. This encounter will be closed and completed by end of day. Should we receive the requested information because of previous outreach attempts, the requested patient's chart will be updated appropriately.     Thank you  Ant Torres MA

## 2025-01-19 ENCOUNTER — APPOINTMENT (OUTPATIENT)
Dept: LAB | Age: 50
End: 2025-01-19
Payer: COMMERCIAL

## 2025-01-19 DIAGNOSIS — E11.00 TYPE 2 DIABETES MELLITUS WITH HYPEROSMOLARITY WITHOUT COMA, WITHOUT LONG-TERM CURRENT USE OF INSULIN (HCC): ICD-10-CM

## 2025-01-19 LAB
ALBUMIN SERPL BCG-MCNC: 4.1 G/DL (ref 3.5–5)
ALP SERPL-CCNC: 53 U/L (ref 34–104)
ALT SERPL W P-5'-P-CCNC: 15 U/L (ref 7–52)
ANION GAP SERPL CALCULATED.3IONS-SCNC: 7 MMOL/L (ref 4–13)
AST SERPL W P-5'-P-CCNC: 15 U/L (ref 13–39)
BILIRUB SERPL-MCNC: 0.57 MG/DL (ref 0.2–1)
BUN SERPL-MCNC: 13 MG/DL (ref 5–25)
CALCIUM SERPL-MCNC: 9.8 MG/DL (ref 8.4–10.2)
CHLORIDE SERPL-SCNC: 101 MMOL/L (ref 96–108)
CO2 SERPL-SCNC: 29 MMOL/L (ref 21–32)
CREAT SERPL-MCNC: 0.82 MG/DL (ref 0.6–1.3)
EST. AVERAGE GLUCOSE BLD GHB EST-MCNC: 169 MG/DL
GFR SERPL CREATININE-BSD FRML MDRD: 102 ML/MIN/1.73SQ M
GLUCOSE P FAST SERPL-MCNC: 133 MG/DL (ref 65–99)
HBA1C MFR BLD: 7.5 %
POTASSIUM SERPL-SCNC: 5 MMOL/L (ref 3.5–5.3)
PROT SERPL-MCNC: 7.2 G/DL (ref 6.4–8.4)
SODIUM SERPL-SCNC: 137 MMOL/L (ref 135–147)

## 2025-01-19 PROCEDURE — 36415 COLL VENOUS BLD VENIPUNCTURE: CPT

## 2025-01-19 PROCEDURE — 83036 HEMOGLOBIN GLYCOSYLATED A1C: CPT

## 2025-01-19 PROCEDURE — 80053 COMPREHEN METABOLIC PANEL: CPT

## 2025-01-21 ENCOUNTER — RESULTS FOLLOW-UP (OUTPATIENT)
Dept: ENDOCRINOLOGY | Facility: HOSPITAL | Age: 50
End: 2025-01-21

## 2025-01-23 ENCOUNTER — OFFICE VISIT (OUTPATIENT)
Dept: ENDOCRINOLOGY | Facility: HOSPITAL | Age: 50
End: 2025-01-23
Payer: COMMERCIAL

## 2025-01-23 VITALS
HEIGHT: 67 IN | BODY MASS INDEX: 21.82 KG/M2 | HEART RATE: 67 BPM | OXYGEN SATURATION: 100 % | WEIGHT: 139 LBS | SYSTOLIC BLOOD PRESSURE: 118 MMHG | DIASTOLIC BLOOD PRESSURE: 70 MMHG

## 2025-01-23 DIAGNOSIS — E11.00 TYPE 2 DIABETES MELLITUS WITH HYPEROSMOLARITY WITHOUT COMA, WITHOUT LONG-TERM CURRENT USE OF INSULIN (HCC): Primary | ICD-10-CM

## 2025-01-23 DIAGNOSIS — E11.65 TYPE 2 DIABETES MELLITUS WITH HYPERGLYCEMIA, WITHOUT LONG-TERM CURRENT USE OF INSULIN (HCC): ICD-10-CM

## 2025-01-23 DIAGNOSIS — E11.649 UNCONTROLLED TYPE 2 DIABETES MELLITUS WITH HYPOGLYCEMIA, UNSPECIFIED HYPOGLYCEMIA COMA STATUS (HCC): ICD-10-CM

## 2025-01-23 DIAGNOSIS — E13.319 RETINOPATHY DUE TO SECONDARY DIABETES (HCC): ICD-10-CM

## 2025-01-23 DIAGNOSIS — E78.2 MIXED HYPERLIPIDEMIA: ICD-10-CM

## 2025-01-23 DIAGNOSIS — R80.9 MICROALBUMINURIA: ICD-10-CM

## 2025-01-23 PROCEDURE — 99214 OFFICE O/P EST MOD 30 MIN: CPT | Performed by: NURSE PRACTITIONER

## 2025-01-23 NOTE — PATIENT INSTRUCTIONS
Be mindful of diet.    Stay active and stay hydrated with water.    For now, continue current regimen.     Make sure to check blood sugar daily at different times. If still running high, please let me know in between office visit.     Call with any concerns or questions.     Follow up in 3 months with lab work prior to visit.

## 2025-01-23 NOTE — PROGRESS NOTES
Marcos Meeks 49 y.o. male MRN: [id number]    Encounter: [id number]      Assessment & Plan     Assessment:  This is a 49 y.o.-year-old male with type 2 diabetes with microalbuminuria and hyperlipidemia.     Plan:  1.  Type 2 diabetes: Most recent hemoglobin A1c is 7.5.  After some discussion, he will continue his current regimen.  At times, he does utilize NovoLog for excessively high glucose levels.  We did discuss the importance of checking blood sugar at alternate times throughout the day to see how we can make adjustments to his medication. Continue with lifestyle modifications to help improve glucose levels.  Contact the office with any concerns or questions.  Follow-up in 3 months with lab work completed prior to visit.     2.  Microalbuminuria: Continue lisinopril.     3.  Hyperlipidemia:   Continue with atorvastatin 20 mg daily.  Check fasting lipid panel prior to next visit.    CC: Type 2 Diabetes follow up    History of Present Illness     HPI:  49 y.o. year old male with type 2 diabetes for 10 years.  He is on oral agents at home and takes Synjardy  milligram daily, metformin 1000 mg daily, Trulicity 3 mg weekly. He denies any polyuria, polydipsia, nocturia and blurry vision.  He denies neuropathy, heart attack, stroke, and claudication but does admit to nephropathy and retinopathy.  Overall he is doing well today.  Weight is stable at this time.  Has not made any significant dietary changes since last office visit.  He is physically active at work.    Hypoglycemic episodes: None recently.     The patient's last eye exam was in June 2023.  Does have bilateral PDR which required laser treatment and injections every 6 weeks.  The patient's last foot exam was in September 2024.     Blood Sugar/Glucometer/Pump/CGM review: No blood sugar logs present at today's office visit.      For his hyperlipidemia is currently on atorvastatin 20 mg daily.  Denies any headaches, vision changes, chest pain, MI or  strokelike symptoms.     Review of Systems   Constitutional: Negative.  Negative for chills, fatigue and fever.   HENT: Negative.  Negative for trouble swallowing and voice change.    Eyes:  Negative for photophobia, pain, discharge, redness, itching and visual disturbance.   Respiratory:  Negative for cough and shortness of breath.    Cardiovascular:  Negative for chest pain and palpitations.   Gastrointestinal:  Negative for abdominal pain, constipation, diarrhea, nausea and vomiting.   Endocrine: Negative for cold intolerance, heat intolerance, polydipsia, polyphagia and polyuria.   Genitourinary: Negative.    Musculoskeletal: Negative.    Skin: Negative.    Allergic/Immunologic: Negative.    Neurological:  Negative for dizziness, syncope, light-headedness and headaches.   Hematological: Negative.    Psychiatric/Behavioral: Negative.     All other systems reviewed and are negative.      Historical Information   Past Medical History:   Diagnosis Date    Diabetes mellitus (HCC)     Hyperlipidemia     Immunization deficiency 6/29/2020    Hep b nonimmune     Macular retinal edema     Microalbuminuria 6/29/2020    Retinopathy due to secondary diabetes (HCC)     Type 2 diabetes mellitus (HCC)      Past Surgical History:   Procedure Laterality Date    EYE SURGERY  5/2020    Right Vitrectomy    LIPOMA RESECTION      on back     Social History   Social History     Substance and Sexual Activity   Alcohol Use Yes    Alcohol/week: 1.0 standard drink of alcohol    Types: 1 Cans of beer per week    Comment: occasional     Social History     Substance and Sexual Activity   Drug Use Never     Social History     Tobacco Use   Smoking Status Former    Current packs/day: 0.25    Average packs/day: 0.3 packs/day for 5.2 years (1.3 ttl pk-yrs)    Types: Cigarettes   Smokeless Tobacco Never     Family History:   Family History   Problem Relation Age of Onset    Hypertension Father     Stroke Father     Depression Father     Cancer  "Maternal Uncle        Meds/Allergies   Current Outpatient Medications   Medication Sig Dispense Refill    atorvastatin (LIPITOR) 20 mg tablet Take 1 tablet (20 mg total) by mouth daily 90 tablet 1    dulaglutide (Trulicity) 3 MG/0.5ML injection Inject 0.5 mL (3 mg total) under the skin every 7 days 6 mL 1    Empagliflozin-metFORMIN HCl ER (Synjardy XR)  MG TB24 Take 1 each by mouth in the morning 90 tablet 1    lisinopril (ZESTRIL) 2.5 mg tablet Take 1 tablet (2.5 mg total) by mouth daily 90 tablet 1    metFORMIN (GLUCOPHAGE-XR) 500 mg 24 hr tablet Take 2 tablets (1,000 mg total) by mouth daily with breakfast 100 tablet 1    Multiple Vitamin (multivitamin) tablet Take 1 tablet by mouth daily      Omega-3 Fatty Acids (FISH OIL PO) Take by mouth      metFORMIN (GLUCOPHAGE-XR) 500 mg 24 hr tablet Take 2 tablets (1,000 mg total) by mouth daily with breakfast (Patient not taking: Reported on 1/23/2025) 180 tablet 1     No current facility-administered medications for this visit.     No Known Allergies    Objective   Vitals: Height 5' 7\" (1.702 m), weight 63 kg (139 lb).    Physical Exam  Vitals reviewed.   Constitutional:       Appearance: He is well-developed.   HENT:      Head: Normocephalic and atraumatic.      Nose: Nose normal.   Eyes:      Conjunctiva/sclera: Conjunctivae normal.      Pupils: Pupils are equal, round, and reactive to light.   Cardiovascular:      Rate and Rhythm: Normal rate and regular rhythm.      Heart sounds: Normal heart sounds.   Pulmonary:      Effort: Pulmonary effort is normal.      Breath sounds: Normal breath sounds.   Abdominal:      General: Bowel sounds are normal.      Palpations: Abdomen is soft.   Musculoskeletal:         General: Normal range of motion.      Cervical back: Normal range of motion and neck supple.   Skin:     General: Skin is warm and dry.   Neurological:      Mental Status: He is alert and oriented to person, place, and time.   Psychiatric:         Behavior: " "Behavior normal.         Thought Content: Thought content normal.         Judgment: Judgment normal.         Lab Results:   Lab Results   Component Value Date/Time    Hemoglobin A1C 7.5 (H) 01/19/2025 10:35 AM    Hemoglobin A1C 8.2 (H) 09/15/2024 08:19 AM    Hemoglobin A1C 8.2 (H) 03/30/2024 08:32 AM    BUN 13 01/19/2025 10:35 AM    BUN 12 09/15/2024 08:19 AM    BUN 19 03/30/2024 08:32 AM    Potassium 5.0 01/19/2025 10:35 AM    Potassium 4.6 09/15/2024 08:19 AM    Potassium 4.8 03/30/2024 08:32 AM    Chloride 101 01/19/2025 10:35 AM    Chloride 102 09/15/2024 08:19 AM    Chloride 101 03/30/2024 08:32 AM    CO2 29 01/19/2025 10:35 AM    CO2 30 09/15/2024 08:19 AM    CO2 28 03/30/2024 08:32 AM    Creatinine 0.82 01/19/2025 10:35 AM    Creatinine 0.87 09/15/2024 08:19 AM    Creatinine 0.70 03/30/2024 08:32 AM    AST 15 01/19/2025 10:35 AM    AST 18 09/15/2024 08:19 AM    AST 19 03/30/2024 08:32 AM    ALT 15 01/19/2025 10:35 AM    ALT 19 09/15/2024 08:19 AM    ALT 23 03/30/2024 08:32 AM    Total Protein 7.2 01/19/2025 10:35 AM    Total Protein 7.0 09/15/2024 08:19 AM    Total Protein 6.9 03/30/2024 08:32 AM    Albumin 4.1 01/19/2025 10:35 AM    Albumin 4.4 09/15/2024 08:19 AM    Albumin 4.2 03/30/2024 08:32 AM    HDL, Direct 39 (L) 03/30/2024 08:32 AM    Triglycerides 92 03/30/2024 08:32 AM     Portions of the record may have been created with voice recognition software. Occasional wrong word or \"sound a like\" substitutions may have occurred due to the inherent limitations of voice recognition software. Read the chart carefully and recognize, using context, where substitutions have occurred.    "

## 2025-03-13 DIAGNOSIS — E11.00 TYPE 2 DIABETES MELLITUS WITH HYPEROSMOLARITY WITHOUT COMA, WITHOUT LONG-TERM CURRENT USE OF INSULIN (HCC): ICD-10-CM

## 2025-03-13 DIAGNOSIS — R80.9 MICROALBUMINURIA: ICD-10-CM

## 2025-03-13 DIAGNOSIS — E78.2 MIXED HYPERLIPIDEMIA: ICD-10-CM

## 2025-03-14 RX ORDER — METFORMIN HYDROCHLORIDE 500 MG/1
1000 TABLET, EXTENDED RELEASE ORAL
Qty: 180 TABLET | Refills: 1 | Status: SHIPPED | OUTPATIENT
Start: 2025-03-14

## 2025-03-14 RX ORDER — LISINOPRIL 2.5 MG/1
2.5 TABLET ORAL DAILY
Qty: 90 TABLET | Refills: 1 | Status: SHIPPED | OUTPATIENT
Start: 2025-03-14 | End: 2025-09-10

## 2025-03-14 RX ORDER — ATORVASTATIN CALCIUM 20 MG/1
20 TABLET, FILM COATED ORAL DAILY
Qty: 90 TABLET | Refills: 1 | Status: SHIPPED | OUTPATIENT
Start: 2025-03-14

## 2025-05-05 DIAGNOSIS — R80.9 MICROALBUMINURIA: ICD-10-CM

## 2025-05-05 DIAGNOSIS — E11.00 TYPE 2 DIABETES MELLITUS WITH HYPEROSMOLARITY WITHOUT COMA, WITHOUT LONG-TERM CURRENT USE OF INSULIN (HCC): ICD-10-CM

## 2025-05-05 DIAGNOSIS — E78.2 MIXED HYPERLIPIDEMIA: ICD-10-CM

## 2025-05-06 RX ORDER — ATORVASTATIN CALCIUM 20 MG/1
20 TABLET, FILM COATED ORAL DAILY
Qty: 90 TABLET | Refills: 0 | OUTPATIENT
Start: 2025-05-06

## 2025-05-06 RX ORDER — METFORMIN HYDROCHLORIDE 500 MG/1
1000 TABLET, EXTENDED RELEASE ORAL
Qty: 180 TABLET | Refills: 0 | OUTPATIENT
Start: 2025-05-06

## 2025-05-06 RX ORDER — LISINOPRIL 2.5 MG/1
2.5 TABLET ORAL DAILY
Qty: 90 TABLET | Refills: 0 | OUTPATIENT
Start: 2025-05-06 | End: 2025-11-02

## 2025-05-06 RX ORDER — EMPAGLIFLOZIN, METFORMIN HYDROCHLORIDE 25; 1000 MG/1; MG/1
1 TABLET, EXTENDED RELEASE ORAL DAILY
Qty: 90 TABLET | Refills: 1 | Status: SHIPPED | OUTPATIENT
Start: 2025-05-06

## 2025-06-22 ENCOUNTER — APPOINTMENT (OUTPATIENT)
Dept: LAB | Age: 50
End: 2025-06-22
Attending: PREVENTIVE MEDICINE

## 2025-06-22 ENCOUNTER — APPOINTMENT (OUTPATIENT)
Dept: LAB | Age: 50
End: 2025-06-22
Payer: COMMERCIAL

## 2025-06-22 DIAGNOSIS — E13.319 RETINOPATHY DUE TO SECONDARY DIABETES (HCC): ICD-10-CM

## 2025-06-22 DIAGNOSIS — E78.2 MIXED HYPERLIPIDEMIA: ICD-10-CM

## 2025-06-22 DIAGNOSIS — E11.649 UNCONTROLLED TYPE 2 DIABETES MELLITUS WITH HYPOGLYCEMIA, UNSPECIFIED HYPOGLYCEMIA COMA STATUS (HCC): ICD-10-CM

## 2025-06-22 DIAGNOSIS — E11.65 TYPE 2 DIABETES MELLITUS WITH HYPERGLYCEMIA, WITHOUT LONG-TERM CURRENT USE OF INSULIN (HCC): ICD-10-CM

## 2025-06-22 DIAGNOSIS — R80.9 MICROALBUMINURIA: ICD-10-CM

## 2025-06-22 DIAGNOSIS — Z00.8 HEALTH EXAMINATION IN POPULATION SURVEY: ICD-10-CM

## 2025-06-22 DIAGNOSIS — E11.00 TYPE 2 DIABETES MELLITUS WITH HYPEROSMOLARITY WITHOUT COMA, WITHOUT LONG-TERM CURRENT USE OF INSULIN (HCC): ICD-10-CM

## 2025-06-22 LAB
ALBUMIN SERPL BCG-MCNC: 4.4 G/DL (ref 3.5–5)
ALP SERPL-CCNC: 57 U/L (ref 34–104)
ALT SERPL W P-5'-P-CCNC: 16 U/L (ref 7–52)
ANION GAP SERPL CALCULATED.3IONS-SCNC: 10 MMOL/L (ref 4–13)
AST SERPL W P-5'-P-CCNC: 16 U/L (ref 13–39)
BILIRUB SERPL-MCNC: 0.54 MG/DL (ref 0.2–1)
BUN SERPL-MCNC: 15 MG/DL (ref 5–25)
CALCIUM SERPL-MCNC: 9.6 MG/DL (ref 8.4–10.2)
CHLORIDE SERPL-SCNC: 102 MMOL/L (ref 96–108)
CHOLEST SERPL-MCNC: 142 MG/DL (ref ?–200)
CO2 SERPL-SCNC: 29 MMOL/L (ref 21–32)
CREAT SERPL-MCNC: 0.71 MG/DL (ref 0.6–1.3)
CREAT UR-MCNC: 73.4 MG/DL
EST. AVERAGE GLUCOSE BLD GHB EST-MCNC: 192 MG/DL
GFR SERPL CREATININE-BSD FRML MDRD: 108 ML/MIN/1.73SQ M
GLUCOSE P FAST SERPL-MCNC: 127 MG/DL (ref 65–99)
HBA1C MFR BLD: 8.3 %
HDLC SERPL-MCNC: 51 MG/DL
LDLC SERPL CALC-MCNC: 77 MG/DL (ref 0–100)
MICROALBUMIN UR-MCNC: 43.8 MG/L
MICROALBUMIN/CREAT 24H UR: 60 MG/G CREATININE (ref 0–30)
NONHDLC SERPL-MCNC: 91 MG/DL
POTASSIUM SERPL-SCNC: 4.4 MMOL/L (ref 3.5–5.3)
PROT SERPL-MCNC: 7.4 G/DL (ref 6.4–8.4)
SODIUM SERPL-SCNC: 141 MMOL/L (ref 135–147)
TRIGL SERPL-MCNC: 71 MG/DL (ref ?–150)

## 2025-06-22 PROCEDURE — 82043 UR ALBUMIN QUANTITATIVE: CPT

## 2025-06-22 PROCEDURE — 85007 BL SMEAR W/DIFF WBC COUNT: CPT

## 2025-06-22 PROCEDURE — 36415 COLL VENOUS BLD VENIPUNCTURE: CPT

## 2025-06-22 PROCEDURE — 80053 COMPREHEN METABOLIC PANEL: CPT

## 2025-06-22 PROCEDURE — 80061 LIPID PANEL: CPT

## 2025-06-22 PROCEDURE — 85027 COMPLETE CBC AUTOMATED: CPT

## 2025-06-22 PROCEDURE — 82570 ASSAY OF URINE CREATININE: CPT

## 2025-06-22 PROCEDURE — 83036 HEMOGLOBIN GLYCOSYLATED A1C: CPT

## 2025-06-23 ENCOUNTER — RESULTS FOLLOW-UP (OUTPATIENT)
Dept: ENDOCRINOLOGY | Facility: HOSPITAL | Age: 50
End: 2025-06-23

## 2025-06-23 PROCEDURE — 85060 BLOOD SMEAR INTERPRETATION: CPT | Performed by: STUDENT IN AN ORGANIZED HEALTH CARE EDUCATION/TRAINING PROGRAM

## 2025-06-24 LAB
BASOPHILS # BLD MANUAL: 0.1 THOUSAND/UL (ref 0–0.1)
BASOPHILS NFR MAR MANUAL: 1 % (ref 0–1)
DIFFERENTIAL COMMENT: ABNORMAL
EOSINOPHIL # BLD MANUAL: 1.81 THOUSAND/UL (ref 0–0.4)
EOSINOPHIL NFR BLD MANUAL: 19 % (ref 0–6)
ERYTHROCYTE [DISTWIDTH] IN BLOOD BY AUTOMATED COUNT: 12.8 % (ref 11.6–15.1)
HCT VFR BLD AUTO: 43.5 % (ref 36.5–49.3)
HGB BLD-MCNC: 13.5 G/DL (ref 12–17)
LYMPHOCYTES # BLD AUTO: 2.76 THOUSAND/UL (ref 0.6–4.47)
LYMPHOCYTES # BLD AUTO: 29 % (ref 14–44)
MACROCYTES BLD QL AUTO: PRESENT
MCH RBC QN AUTO: 31.1 PG (ref 26.8–34.3)
MCHC RBC AUTO-ENTMCNC: 31 G/DL (ref 31.4–37.4)
MCV RBC AUTO: 100 FL (ref 82–98)
MONOCYTES # BLD AUTO: 0.29 THOUSAND/UL (ref 0–1.22)
MONOCYTES NFR BLD: 3 % (ref 4–12)
NEUTROPHILS # BLD MANUAL: 4.56 THOUSAND/UL (ref 1.85–7.62)
NEUTS SEG NFR BLD AUTO: 48 % (ref 43–75)
PATHOLOGY REVIEW: YES
PLATELET # BLD AUTO: 260 THOUSANDS/UL (ref 149–390)
PLATELET BLD QL SMEAR: ADEQUATE
PMV BLD AUTO: 10.3 FL (ref 8.9–12.7)
RBC # BLD AUTO: 4.34 MILLION/UL (ref 3.88–5.62)
RBC MORPH BLD: PRESENT
WBC # BLD AUTO: 9.5 THOUSAND/UL (ref 4.31–10.16)

## 2025-07-01 DIAGNOSIS — E11.00 TYPE 2 DIABETES MELLITUS WITH HYPEROSMOLARITY WITHOUT COMA, WITHOUT LONG-TERM CURRENT USE OF INSULIN (HCC): ICD-10-CM

## 2025-07-22 ENCOUNTER — OFFICE VISIT (OUTPATIENT)
Dept: ENDOCRINOLOGY | Facility: HOSPITAL | Age: 50
End: 2025-07-22
Payer: COMMERCIAL

## 2025-07-22 VITALS
DIASTOLIC BLOOD PRESSURE: 64 MMHG | WEIGHT: 139 LBS | BODY MASS INDEX: 21.82 KG/M2 | HEIGHT: 67 IN | OXYGEN SATURATION: 99 % | HEART RATE: 91 BPM | SYSTOLIC BLOOD PRESSURE: 120 MMHG

## 2025-07-22 DIAGNOSIS — E11.00 TYPE 2 DIABETES MELLITUS WITH HYPEROSMOLARITY WITHOUT COMA, WITHOUT LONG-TERM CURRENT USE OF INSULIN (HCC): Primary | ICD-10-CM

## 2025-07-22 DIAGNOSIS — R80.9 MICROALBUMINURIA: ICD-10-CM

## 2025-07-22 DIAGNOSIS — E11.65 TYPE 2 DIABETES MELLITUS WITH HYPERGLYCEMIA, WITHOUT LONG-TERM CURRENT USE OF INSULIN (HCC): ICD-10-CM

## 2025-07-22 DIAGNOSIS — E13.319 RETINOPATHY DUE TO SECONDARY DIABETES (HCC): ICD-10-CM

## 2025-07-22 DIAGNOSIS — E11.649 UNCONTROLLED TYPE 2 DIABETES MELLITUS WITH HYPOGLYCEMIA, UNSPECIFIED HYPOGLYCEMIA COMA STATUS (HCC): ICD-10-CM

## 2025-07-22 DIAGNOSIS — E78.2 MIXED HYPERLIPIDEMIA: ICD-10-CM

## 2025-07-22 PROCEDURE — 99214 OFFICE O/P EST MOD 30 MIN: CPT | Performed by: NURSE PRACTITIONER

## 2025-07-22 RX ORDER — LISINOPRIL 2.5 MG/1
2.5 TABLET ORAL DAILY
Qty: 90 TABLET | Refills: 3 | Status: SHIPPED | OUTPATIENT
Start: 2025-07-22 | End: 2026-07-17

## 2025-07-22 RX ORDER — EMPAGLIFLOZIN, METFORMIN HYDROCHLORIDE 25; 1000 MG/1; MG/1
1 TABLET, EXTENDED RELEASE ORAL DAILY
Qty: 90 TABLET | Refills: 3 | Status: SHIPPED | OUTPATIENT
Start: 2025-07-22

## 2025-07-22 RX ORDER — ATORVASTATIN CALCIUM 20 MG/1
20 TABLET, FILM COATED ORAL DAILY
Qty: 90 TABLET | Refills: 3 | Status: SHIPPED | OUTPATIENT
Start: 2025-07-22

## 2025-07-22 RX ORDER — METFORMIN HYDROCHLORIDE 500 MG/1
1000 TABLET, EXTENDED RELEASE ORAL
Qty: 180 TABLET | Refills: 3 | Status: SHIPPED | OUTPATIENT
Start: 2025-07-22

## 2025-07-22 NOTE — ASSESSMENT & PLAN NOTE
Lab Results   Component Value Date    HGBA1C 8.3 (H) 06/22/2025       Orders:    CBC and differential; Future    Comprehensive metabolic panel; Future    Hemoglobin A1C; Future    TSH, 3rd generation; Future

## 2025-07-22 NOTE — ASSESSMENT & PLAN NOTE
Lab Results   Component Value Date    HGBA1C 8.3 (H) 06/22/2025       Orders:    CBC and differential; Future    Comprehensive metabolic panel; Future    Hemoglobin A1C; Future    TSH, 3rd generation; Future      Plan:  1.  Type 2 diabetes: Most recent hemoglobin A1c is 8.3.  He does admit to dietary indiscretions and noncompliance with medications while on vacation for the past 2 months.  After some discussion, he will continue his current regimen and refocus on his diet and exercise.  We did discuss the importance of checking blood sugar at alternate times throughout the day to see how we can make adjustments to his medication. Continue with lifestyle modifications to help improve glucose levels.  Contact the office with any concerns or questions.  Follow-up in 3 months with lab work completed prior to visit.     2.  Microalbuminuria: Continue lisinopril.     3.  Hyperlipidemia:   Continue with atorvastatin 20 mg daily.  Check fasting lipid panel prior to next visit.

## 2025-07-22 NOTE — ASSESSMENT & PLAN NOTE
Orders:    CBC and differential; Future    Comprehensive metabolic panel; Future    Hemoglobin A1C; Future    TSH, 3rd generation; Future    atorvastatin (LIPITOR) 20 mg tablet; Take 1 tablet (20 mg total) by mouth daily

## 2025-07-22 NOTE — PROGRESS NOTES
Name: Marcos Meeks      : 1975      MRN: [id number]  Encounter Provider: OSCAR Ring  Encounter Date: 2025   Encounter department: [facility]      Assessment & Plan  Type 2 diabetes mellitus with hyperosmolarity without coma, without long-term current use of insulin (HCC)    Lab Results   Component Value Date    HGBA1C 8.3 (H) 2025       Orders:    CBC and differential; Future    Comprehensive metabolic panel; Future    Hemoglobin A1C; Future    TSH, 3rd generation; Future    Empagliflozin-metFORMIN HCl ER (Synjardy XR)  MG TB24; Take 1 each by mouth in the morning    metFORMIN (GLUCOPHAGE-XR) 500 mg 24 hr tablet; Take 2 tablets (1,000 mg total) by mouth daily with breakfast    Dulaglutide 3 MG/0.5ML SOAJ; Inject 3 mg weekly    Type 2 diabetes mellitus with hyperglycemia, without long-term current use of insulin (HCC)    Lab Results   Component Value Date    HGBA1C 8.3 (H) 2025       Orders:    CBC and differential; Future    Comprehensive metabolic panel; Future    Hemoglobin A1C; Future    TSH, 3rd generation; Future    Microalbuminuria    Orders:    CBC and differential; Future    Comprehensive metabolic panel; Future    Hemoglobin A1C; Future    TSH, 3rd generation; Future    lisinopril (ZESTRIL) 2.5 mg tablet; Take 1 tablet (2.5 mg total) by mouth daily    Uncontrolled type 2 diabetes mellitus with hypoglycemia, unspecified hypoglycemia coma status (HCC)    Lab Results   Component Value Date    HGBA1C 8.3 (H) 2025       Orders:    CBC and differential; Future    Comprehensive metabolic panel; Future    Hemoglobin A1C; Future    TSH, 3rd generation; Future    Mixed hyperlipidemia    Orders:    CBC and differential; Future    Comprehensive metabolic panel; Future    Hemoglobin A1C; Future    TSH, 3rd generation; Future    atorvastatin (LIPITOR) 20 mg tablet; Take 1 tablet (20 mg total) by mouth daily    Retinopathy  due to secondary diabetes (HCC)    Lab Results   Component Value Date    HGBA1C 8.3 (H) 06/22/2025       Orders:    CBC and differential; Future    Comprehensive metabolic panel; Future    Hemoglobin A1C; Future    TSH, 3rd generation; Future      Plan:  1.  Type 2 diabetes: Most recent hemoglobin A1c is 8.3.  He does admit to dietary indiscretions and noncompliance with medications while on vacation for the past 2 months.  After some discussion, he will continue his current regimen and refocus on his diet and exercise.  We did discuss the importance of checking blood sugar at alternate times throughout the day to see how we can make adjustments to his medication. Continue with lifestyle modifications to help improve glucose levels.  Contact the office with any concerns or questions.  Follow-up in 3 months with lab work completed prior to visit.     2.  Microalbuminuria: Continue lisinopril.     3.  Hyperlipidemia:   Continue with atorvastatin 20 mg daily.  Check fasting lipid panel prior to next visit.        History of Present Illness     49 y.o. year old male with type 2 diabetes for 10 years.  He is on oral agents at home and takes Synjardy  milligram daily, metformin 1000 mg daily, Trulicity 3 mg weekly. He denies any polyuria, polydipsia, nocturia and blurry vision.  He denies neuropathy, heart attack, stroke, and claudication but does admit to nephropathy and retinopathy.  Overall he is doing well today.  Weight is stable at this time.  Has not made any significant dietary changes since last office visit.  He is physically active at work.  He has been on vacation in [place] for about 1.5 months.     Hypoglycemic episodes: None recently.     The patient's last eye exam was in June 2023.  Does have bilateral PDR which required laser treatment and injections every 6 weeks.  The patient's last foot exam was in September 2024.     Blood Sugar/Glucometer/Pump/CGM review: No blood sugar logs present at today's  "office visit.      For his hyperlipidemia is currently on atorvastatin 20 mg daily.  Denies any headaches, vision changes, chest pain, MI or strokelike symptoms.       Review of Systems   Constitutional: Negative.  Negative for chills, fatigue and fever.   HENT: Negative.  Negative for trouble swallowing and voice change.    Eyes:  Negative for photophobia, pain, discharge, redness, itching and visual disturbance.   Respiratory:  Negative for cough and shortness of breath.    Cardiovascular:  Negative for chest pain and palpitations.   Gastrointestinal:  Negative for abdominal pain, constipation, diarrhea, nausea and vomiting.   Endocrine: Negative for cold intolerance, heat intolerance, polydipsia, polyphagia and polyuria.   Genitourinary: Negative.    Musculoskeletal: Negative.    Skin: Negative.    Allergic/Immunologic: Negative.    Neurological:  Negative for dizziness, syncope, light-headedness and headaches.   Hematological: Negative.    Psychiatric/Behavioral: Negative.     All other systems reviewed and are negative.   as per HPI        Objective   Ht 5' 7\" (1.702 m)   Wt 63 kg (139 lb)   BMI 21.77 kg/m²      Body mass index is 21.77 kg/m².  Wt Readings from Last 3 Encounters:   07/22/25 63 kg (139 lb)   01/23/25 63 kg (139 lb)   09/24/24 63 kg (139 lb)     Physical Exam  Vitals reviewed.   Constitutional:       Appearance: He is well-developed.   HENT:      Head: Normocephalic and atraumatic.      Nose: Nose normal.     Eyes:      Conjunctiva/sclera: Conjunctivae normal.      Pupils: Pupils are equal, round, and reactive to light.       Cardiovascular:      Rate and Rhythm: Normal rate and regular rhythm.      Heart sounds: Normal heart sounds.   Pulmonary:      Effort: Pulmonary effort is normal.      Breath sounds: Normal breath sounds.   Abdominal:      General: Bowel sounds are normal.      Palpations: Abdomen is soft.     Musculoskeletal:         General: Normal range of motion.      Cervical back: " Normal range of motion and neck supple.     Skin:     General: Skin is warm and dry.     Neurological:      Mental Status: He is alert and oriented to person, place, and time.     Psychiatric:         Behavior: Behavior normal.         Thought Content: Thought content normal.         Judgment: Judgment normal.       Last Eye Exam: 06/26/2023  Last Foot Exam: 09/24/2024  Health Maintenance   Topic Date Due    Diabetic Eye Exam  06/26/2024    Diabetic Foot Exam  09/24/2025         Labs: I have reviewed pertinent labs including:   Lab Results   Component Value Date    HGBA1C 8.3 (H) 06/22/2025    HGBA1C 7.5 (H) 01/19/2025    HGBA1C 8.2 (H) 09/15/2024      Lab Results   Component Value Date    CREATININE 0.71 06/22/2025    CREATININE 0.82 01/19/2025    CREATININE 0.87 09/15/2024    BUN 15 06/22/2025    K 4.4 06/22/2025     06/22/2025    CO2 29 06/22/2025      eGFR   Date Value Ref Range Status   06/22/2025 108 ml/min/1.73sq m Final      HDL, Direct   Date Value Ref Range Status   06/22/2025 51 >=40 mg/dL Final     Comment:     HDL Cholesterol:       Low     <41 mg/dL     Triglycerides   Date Value Ref Range Status   06/22/2025 71 See Comment mg/dL Final     Comment:     Triglyceride:     0-9Y            <75mg/dL     10Y-17Y         <90 mg/dL       >=18Y     Normal          <150 mg/dL     Borderline High 150-199 mg/dL     High            200-499 mg/dL        Very High       >499 mg/dL    Specimen collection should occur prior to Metamizole administration due to the potential for falsely depressed results.      ALT   Date Value Ref Range Status   06/22/2025 16 7 - 52 U/L Final     Comment:     Specimen collection should occur prior to Sulfasalazine administration due to the potential for falsely depressed results.      AST   Date Value Ref Range Status   06/22/2025 16 13 - 39 U/L Final     Alkaline Phosphatase   Date Value Ref Range Status   06/22/2025 57 34 - 104 U/L Final      Lab Results   Component Value Date     "GAD1SHGGCUCL 1.040 03/26/2023      No results found for: \"FREET4\", \"T3FREE\", \"TSI\", \"ANTITPOAB\"   Lab Results   Component Value Date    MTTQ51MYMCMX 50.2 09/11/2022    [id number] 32.8 05/29/2021          Discussed with the patient and all questioned fully answered. He will call me if any problems arise.      "

## 2025-07-22 NOTE — ASSESSMENT & PLAN NOTE
Orders:    CBC and differential; Future    Comprehensive metabolic panel; Future    Hemoglobin A1C; Future    TSH, 3rd generation; Future    lisinopril (ZESTRIL) 2.5 mg tablet; Take 1 tablet (2.5 mg total) by mouth daily

## 2025-07-23 ENCOUNTER — TELEPHONE (OUTPATIENT)
Age: 50
End: 2025-07-23

## 2025-07-23 NOTE — TELEPHONE ENCOUNTER
PA for dulaglutide 3mg SUBMITTED to [facility]    via    []CMM-KEY:   [x]Surescripts-Case ID #[id number]    []Availity-Auth ID # NDC #   []Faxed to plan   []Other website   []Phone call Case ID #     [x]PA sent as URGENT    All office notes, labs and other pertaining documents and studies sent. Clinical questions answered. Awaiting determination from insurance company.     Turnaround time for your insurance to make a decision on your Prior Authorization can take 7-21 business days.

## 2025-07-28 NOTE — TELEPHONE ENCOUNTER
PA for DULAGLUTIDE 3MG NOT REQUIRED     Reason (screenshot if applicable)          Patient advised by          [x] MyChart Message  [] Phone call   []LMOM  []L/M to call office as no active Communication consent on file  []Unable to leave detailed message as VM not approved on Communication consent       Pharmacy advised by    [x]Fax  []Phone call